# Patient Record
Sex: MALE | Race: OTHER | Employment: OTHER | URBAN - METROPOLITAN AREA
[De-identification: names, ages, dates, MRNs, and addresses within clinical notes are randomized per-mention and may not be internally consistent; named-entity substitution may affect disease eponyms.]

---

## 2017-11-01 ENCOUNTER — APPOINTMENT (OUTPATIENT)
Dept: CT IMAGING | Facility: HOSPITAL | Age: 67
DRG: 392 | End: 2017-11-01
Attending: EMERGENCY MEDICINE
Payer: MEDICARE

## 2017-11-01 ENCOUNTER — HOSPITAL ENCOUNTER (INPATIENT)
Facility: HOSPITAL | Age: 67
LOS: 2 days | Discharge: HOME | DRG: 392 | End: 2017-11-03
Attending: EMERGENCY MEDICINE | Admitting: STUDENT IN AN ORGANIZED HEALTH CARE EDUCATION/TRAINING PROGRAM
Payer: MEDICARE

## 2017-11-01 DIAGNOSIS — K21.00 GASTROESOPHAGEAL REFLUX DISEASE WITH ESOPHAGITIS: Primary | ICD-10-CM

## 2017-11-01 DIAGNOSIS — Z79.4 TYPE 2 DIABETES MELLITUS WITHOUT COMPLICATION, WITH LONG-TERM CURRENT USE OF INSULIN (HCC): ICD-10-CM

## 2017-11-01 DIAGNOSIS — E87.1 HYPONATREMIA: ICD-10-CM

## 2017-11-01 DIAGNOSIS — E87.5 HYPERKALEMIA: ICD-10-CM

## 2017-11-01 DIAGNOSIS — E11.9 TYPE 2 DIABETES MELLITUS WITHOUT COMPLICATION, WITH LONG-TERM CURRENT USE OF INSULIN (HCC): ICD-10-CM

## 2017-11-01 PROCEDURE — 99223 1ST HOSP IP/OBS HIGH 75: CPT | Performed by: STUDENT IN AN ORGANIZED HEALTH CARE EDUCATION/TRAINING PROGRAM

## 2017-11-01 PROCEDURE — 74177 CT ABD & PELVIS W/CONTRAST: CPT | Performed by: EMERGENCY MEDICINE

## 2017-11-01 RX ORDER — ONDANSETRON 2 MG/ML
4 INJECTION INTRAMUSCULAR; INTRAVENOUS EVERY 6 HOURS PRN
Status: DISCONTINUED | OUTPATIENT
Start: 2017-11-01 | End: 2017-11-03

## 2017-11-01 RX ORDER — HYDROMORPHONE HYDROCHLORIDE 1 MG/ML
0.5 INJECTION, SOLUTION INTRAMUSCULAR; INTRAVENOUS; SUBCUTANEOUS EVERY 30 MIN PRN
Status: ACTIVE | OUTPATIENT
Start: 2017-11-01 | End: 2017-11-02

## 2017-11-01 RX ORDER — ONDANSETRON 2 MG/ML
4 INJECTION INTRAMUSCULAR; INTRAVENOUS EVERY 4 HOURS PRN
Status: DISCONTINUED | OUTPATIENT
Start: 2017-11-01 | End: 2017-11-01

## 2017-11-01 RX ORDER — MORPHINE SULFATE 4 MG/ML
1 INJECTION, SOLUTION INTRAMUSCULAR; INTRAVENOUS EVERY 4 HOURS PRN
Status: DISCONTINUED | OUTPATIENT
Start: 2017-11-01 | End: 2017-11-03

## 2017-11-01 RX ORDER — DEXTROSE MONOHYDRATE 25 G/50ML
50 INJECTION, SOLUTION INTRAVENOUS
Status: DISCONTINUED | OUTPATIENT
Start: 2017-11-01 | End: 2017-11-03

## 2017-11-01 RX ORDER — SODIUM CHLORIDE 9 MG/ML
INJECTION, SOLUTION INTRAVENOUS CONTINUOUS
Status: DISCONTINUED | OUTPATIENT
Start: 2017-11-01 | End: 2017-11-02

## 2017-11-01 RX ORDER — MAGNESIUM HYDROXIDE/ALUMINUM HYDROXICE/SIMETHICONE 120; 1200; 1200 MG/30ML; MG/30ML; MG/30ML
30 SUSPENSION ORAL ONCE
Status: COMPLETED | OUTPATIENT
Start: 2017-11-01 | End: 2017-11-01

## 2017-11-01 RX ORDER — ENOXAPARIN SODIUM 100 MG/ML
40 INJECTION SUBCUTANEOUS DAILY
Status: DISCONTINUED | OUTPATIENT
Start: 2017-11-02 | End: 2017-11-03

## 2017-11-01 RX ORDER — ACETAMINOPHEN 325 MG/1
650 TABLET ORAL EVERY 6 HOURS PRN
Status: DISCONTINUED | OUTPATIENT
Start: 2017-11-01 | End: 2017-11-03

## 2017-11-01 RX ORDER — DEXTROSE MONOHYDRATE 25 G/50ML
50 INJECTION, SOLUTION INTRAVENOUS ONCE
Status: COMPLETED | OUTPATIENT
Start: 2017-11-01 | End: 2017-11-01

## 2017-11-01 RX ORDER — SODIUM CHLORIDE 9 MG/ML
INJECTION, SOLUTION INTRAVENOUS CONTINUOUS
Status: ACTIVE | OUTPATIENT
Start: 2017-11-01 | End: 2017-11-02

## 2017-11-01 RX ORDER — SODIUM CHLORIDE 9 MG/ML
INJECTION, SOLUTION INTRAVENOUS CONTINUOUS
Status: DISCONTINUED | OUTPATIENT
Start: 2017-11-01 | End: 2017-11-03

## 2017-11-01 RX ORDER — FAMOTIDINE 10 MG/ML
20 INJECTION, SOLUTION INTRAVENOUS ONCE
Status: COMPLETED | OUTPATIENT
Start: 2017-11-01 | End: 2017-11-01

## 2017-11-01 RX ORDER — MAGNESIUM HYDROXIDE/ALUMINUM HYDROXICE/SIMETHICONE 120; 1200; 1200 MG/30ML; MG/30ML; MG/30ML
30 SUSPENSION ORAL 4 TIMES DAILY PRN
Status: DISCONTINUED | OUTPATIENT
Start: 2017-11-01 | End: 2017-11-03

## 2017-11-01 RX ORDER — OMEPRAZOLE 40 MG/1
40 CAPSULE, DELAYED RELEASE ORAL DAILY
Qty: 30 CAPSULE | Refills: 0 | Status: SHIPPED | OUTPATIENT
Start: 2017-11-01 | End: 2017-11-03

## 2017-11-01 NOTE — ED INITIAL ASSESSMENT (HPI)
Generalized abd pain x4 days. C/o nausea, no vomiting. Deny diarrhea. No fever. Pt type 2 IDDM, ran out of insulin medication 3 days ago. Has not been checking his glucose.

## 2017-11-02 ENCOUNTER — SURGERY (OUTPATIENT)
Age: 67
End: 2017-11-02

## 2017-11-02 ENCOUNTER — APPOINTMENT (OUTPATIENT)
Dept: ULTRASOUND IMAGING | Facility: HOSPITAL | Age: 67
DRG: 392 | End: 2017-11-02
Attending: STUDENT IN AN ORGANIZED HEALTH CARE EDUCATION/TRAINING PROGRAM
Payer: MEDICARE

## 2017-11-02 PROCEDURE — 76700 US EXAM ABDOM COMPLETE: CPT | Performed by: STUDENT IN AN ORGANIZED HEALTH CARE EDUCATION/TRAINING PROGRAM

## 2017-11-02 PROCEDURE — 0DB68ZX EXCISION OF STOMACH, VIA NATURAL OR ARTIFICIAL OPENING ENDOSCOPIC, DIAGNOSTIC: ICD-10-PCS | Performed by: INTERNAL MEDICINE

## 2017-11-02 PROCEDURE — 99232 SBSQ HOSP IP/OBS MODERATE 35: CPT | Performed by: INTERNAL MEDICINE

## 2017-11-02 RX ORDER — MIDAZOLAM HYDROCHLORIDE 1 MG/ML
INJECTION INTRAMUSCULAR; INTRAVENOUS
Status: DISCONTINUED | OUTPATIENT
Start: 2017-11-02 | End: 2017-11-02 | Stop reason: HOSPADM

## 2017-11-02 RX ORDER — PANTOPRAZOLE SODIUM 40 MG/1
40 TABLET, DELAYED RELEASE ORAL
Status: DISCONTINUED | OUTPATIENT
Start: 2017-11-02 | End: 2017-11-03

## 2017-11-02 NOTE — PROGRESS NOTES
NURSING ADMISSION NOTE      Patient admitted via Cart  Oriented to room 521  Safety precautions initiated. Bed in low position. Call light in reach. Pt arrived to [facility] from emergency department.  Pt A/O x 3, primarily Wolof speaking, pt/family ref

## 2017-11-02 NOTE — PROGRESS NOTES
[facility]  Progress Note     Mat Jason Patient Status:  Inpatient    1950 MRN [id number]   [facility] 5NW-A Attending Alina Childers MD   [facility] Day # 1 PCP None Pcp     Chief Complaint: abd pain    S: Patient doing well, no n Pen  1-5 Units Subcutaneous TID CC and HS   • pantoprazole (PROTONIX) IV push  40 mg Intravenous Daily       ASSESSMENT / PLAN:     1. Nausea, GI pain suspect due to esophageal ulcer  1. Pt stopped PPI 6 mo ago  2.  CT A/P reviewed- hepatic steatosis noted

## 2017-11-02 NOTE — OPERATIVE REPORT
[facility]  Esophagogastroduodenoscopy Report    Elsa Jacobo Patient Status:  Inpatient    1950 MRN [id number]   [facility] ENDOSCOPY Attending Ro Hess MD      DATE OF OPERATION: 2017     PREOPERATIVE DIAGNOSIS:

## 2017-11-02 NOTE — CM/SW NOTE
Mino Espitia 11/02/17 1500   CM/SW Screening   Referral [id number] staff; Chart review;Nursing rounds   Patient's Mental Status Alert;Oriented   Patient lives with Spouse   Patient Status Prior to Admission   Independent with ADLs

## 2017-11-02 NOTE — H&P
[facility]  History and Physical     Nancy Half Patient Status:  Emergency    1950 MRN [id number]   Location [facility] Attending Isabel Hair MD   Hosp Day # 0 PCP None Pcp     Chief Complaint: Abdominal obed Onset   • Diabetes Sister        Allergies: No Known Allergies    Medications:    No current facility-administered medications on file prior to encounter.    Current Outpatient Prescriptions on File Prior to Encounter:  Omeprazole 40 MG Oral Capsule Delayed CL  94*   CO2  22.0   ALKPHO  67   AST  23   ALT  33   BILT  0.8   TP  6.7       Estimated Creatinine Clearance: 52.2 mL/min (based on SCr of 1.21 mg/dL). No results for input(s): PTP, INR in the last 72 hours.     No results for input(s): TROP, CK in

## 2017-11-02 NOTE — PLAN OF CARE
Diabetes/Glucose Control    • Glucose maintained within prescribed range Progressing        GASTROINTESTINAL - ADULT    • Minimal or absence of nausea and vomiting Progressing    • Maintains adequate nutritional intake (undernourished) Progressing        P

## 2017-11-02 NOTE — CONSULTS
[facility] Patient Status:  Inpatient   Date of Birth 6/9/1950 MRN [id number]   [facility] 5NW-A Attending Jeaneth Roberts MD   [facility] Day # 1 PCP None Pcp     Date of Consultation:  11/2/2017 MG Oral Tab, Sig TAKE 1 TABLET(1000 MG) BY MOUTH TWICE DAILY WITH MEALS, Start Date 16, End Date , Taking?  Yes, Authorizing Provider Yen Min MD    Medication Lisinopril-Hydrochlorothiazide 20-12.5 MG Oral Tab, Sig Take 1 tablet by mouth once skye positives and negatives noted in the the HPI.       Physical Exam:    Vital Signs:    11/02/17  0807   BP: 133/71   Pulse: 90   Resp: 18   Temp: 98.8 °F (37.1 °C)     General: Alert, oriented and in no acute distress  HEENT: normocephalic; non-icteric; oral enlargement or focal lesion. PANCREAS:  No areli-pancreatic inflammatory stranding  ADRENALS:  Normal.  No mass or enlargement. KIDNEYS:  Kidneys are symmetrical in size without evidence of hydronephrosis. Bilateral renal cysts.  Hypodense lesion within

## 2017-11-02 NOTE — ED PROVIDER NOTES
Patient Seen in: [facility] Emergency Department    History   Patient presents with:  Abdomen/Flank Pain (GI/)  Nausea/Vomiting/Diarrhea (gastrointestinal)    Stated Complaint: abd pain, nausea    HPI    61-year-old male presents with abdominal pa Smokeless tobacco: Never Used                      Alcohol use: Yes              Comment: SOCIAL      Review of Systems    Positive for stated complaint: abd pain, nausea  Other systems are as noted in HPI.   Constitu were created for panel order CBC WITH DIFFERENTIAL WITH PLATELET.   Procedure                               Abnormality         Status                     ---------                               -----------         ------                     CBC W/ DIFFERSABRINA kidney measuring 1.9 x 2.1 cm contains tiny peripheral calcification and is slightly increased in size from 1/25/16. This is hyperdense on noncontrast imaging and may represent a complex cyst but is technically indeterminate.  BOWEL/MESENTERY:  Bowel is nor with patient and family who agree with admission. [facility]  Await bed assignment.     Disposition and Plan     Clinical Impression:  Gastroesophageal reflux disease with esophagitis  (primary encounter diagnosis)  Hyperk

## 2017-11-03 VITALS
WEIGHT: 210.31 LBS | OXYGEN SATURATION: 95 % | RESPIRATION RATE: 18 BRPM | DIASTOLIC BLOOD PRESSURE: 77 MMHG | TEMPERATURE: 98 F | HEIGHT: 65 IN | SYSTOLIC BLOOD PRESSURE: 145 MMHG | BODY MASS INDEX: 35.04 KG/M2 | HEART RATE: 71 BPM

## 2017-11-03 PROCEDURE — 99239 HOSP IP/OBS DSCHRG MGMT >30: CPT | Performed by: INTERNAL MEDICINE

## 2017-11-03 RX ORDER — BLOOD SUGAR DIAGNOSTIC
STRIP MISCELLANEOUS
Qty: 50 STRIP | Refills: 1 | Status: SHIPPED | OUTPATIENT
Start: 2017-11-03 | End: 2018-01-16

## 2017-11-03 RX ORDER — LANCETS 28 GAUGE
EACH MISCELLANEOUS
Qty: 50 EACH | Refills: 1 | Status: SHIPPED | OUTPATIENT
Start: 2017-11-03 | End: 2018-01-16

## 2017-11-03 RX ORDER — OMEPRAZOLE 40 MG/1
40 CAPSULE, DELAYED RELEASE ORAL 2 TIMES DAILY
Qty: 60 CAPSULE | Refills: 0 | Status: SHIPPED | OUTPATIENT
Start: 2017-11-03 | End: 2017-11-30

## 2017-11-03 NOTE — PROGRESS NOTES
[facility]  GI Progress Note      Shante Nettles Patient Status:  Inpatient    1950 MRN [id number]   [facility] 5NW-A Attending Bartolo Bautista MD   [facility] Day # 2 PCP None Pcp          SUBJECTIVE:     He feels better. No pain.     [address]

## 2017-11-03 NOTE — PROGRESS NOTES
Pt being discharged, meds and gluc meter discussed with pt and daughter, pt watched videos about dm education, all f/u appts disccused.  IV dcd

## 2017-11-03 NOTE — DISCHARGE SUMMARY
[facility] [place] [facility]  DISCHARGE SUMMARY     Eryn Ca Patient Status:  Inpatient    1950 MRN [id number]   [facility] 5NW-A Attending Campbell Lee MD   [facility] Day [facility] PCP None Pcp     Date of Admission: 2017  Date of Discharge:  11 cyst better visualized on recent contrast enhanced CT. A dedicated CT of the kidneys can be performed for further evaluation as clinically indicated.     Dictated by: Yolanda Ivey MD on 11/02/2017 at 11:35     Approved by: Yolanda Ivey MD            Ct Abdomen+ Instructions Prescription details   Omeprazole 40 MG Cpdr  What changed:  · how much to take  · how to take this  · when to take this  · additional instructions      Take 1 capsule (40 mg total) by mouth 2 (two) times daily.    Stop taking on:  12/3/2017  Q edema.  -----------------------------------------------------------------------------------------------  PATIENT DISCHARGE INSTRUCTIONS: See electronic chart    Ruth Fagan MD 11/3/2017    Time spent:  > 30 minutes

## 2017-11-03 NOTE — CONSULTS
[facility]  Diabetes Consult Note    Jone Cervantes Patient Status:  Inpatient    1950 MRN [id number]   [facility] 5NW-A Attending Wood Decker MD   [facility] Day # 2 PCP None Pcp     Reason for Consult:     Uncontrolled type 2 diabete 11/02/17   1146  11/02/17   2100  11/03/17   0734   PGLU  253*  161*  144*  196*  189*       Recent Labs   11/01/17  1834  11/02/17  0029 11/02/17  0704  11/03/17  0734   *  126*  --  130* 133*  --   --    CL 95*  94*  --  96* 100*  --   --    CO2 22 symptoms and treatment for hypo/hyperglycemia, sick day management. Discuss complication risks as a consequence of high blood glucose and the need for insulin. He became tearful, but stated he understood.   At this time he only has Medicare Part A and B -

## 2017-11-14 ENCOUNTER — OFFICE VISIT (OUTPATIENT)
Dept: ENDOCRINOLOGY CLINIC | Facility: CLINIC | Age: 67
End: 2017-11-14

## 2017-11-14 VITALS
TEMPERATURE: 98 F | WEIGHT: 216 LBS | DIASTOLIC BLOOD PRESSURE: 60 MMHG | HEIGHT: 66 IN | SYSTOLIC BLOOD PRESSURE: 104 MMHG | HEART RATE: 68 BPM | RESPIRATION RATE: 18 BRPM | BODY MASS INDEX: 34.72 KG/M2

## 2017-11-14 DIAGNOSIS — E11.9 TYPE 2 DIABETES MELLITUS WITHOUT COMPLICATION, WITH LONG-TERM CURRENT USE OF INSULIN (HCC): Primary | ICD-10-CM

## 2017-11-14 DIAGNOSIS — Z79.4 TYPE 2 DIABETES MELLITUS WITHOUT COMPLICATION, WITH LONG-TERM CURRENT USE OF INSULIN (HCC): Primary | ICD-10-CM

## 2017-11-14 RX ORDER — CALCIUM CITRATE/VITAMIN D3 200MG-6.25
TABLET ORAL
Qty: 100 STRIP | Refills: 1 | Status: SHIPPED | OUTPATIENT
Start: 2017-11-14 | End: 2017-12-19

## 2017-11-14 RX ORDER — GLUCOSAM/CHON-MSM1/C/MANG/BOSW 500-416.6
1 TABLET ORAL DAILY
Qty: 1 BOX | Refills: 0 | Status: SHIPPED | OUTPATIENT
Start: 2017-11-14 | End: 2017-12-19

## 2017-11-14 NOTE — PROGRESS NOTES
Diabetes Education:    Taught patient how to use True Metrix glucose meter. He was able to test his glucose today while in the office. Glucose approximately 3 hours after eating was 289 mg/dl. Meter and strips provided.     Ana Salazar RD, LDN, CDE  Ed

## 2017-11-14 NOTE — PROGRESS NOTES
CC: Patient presents with:  Diabetes: new pt.  referred by hospital.      HISTORY:  Past Medical History:   Diagnosis Date   • Arthritis    • BPH (benign prostatic hypertrophy)    • Esophageal reflux    • Obesity    • STROKE     8 or 9 yrs ago per family 11/02/2017   A1C 11.9 (H) 11/02/2017       Diabetes Type: 2  Duration:> 15 years   Current Meds: metformin 1000 mg bid    SE: denies   Failed Meds: stated had been on insulin in past     Overall glucose control:poor uncontrolled unstable A1C at 11.9%   Colt Palacios Only:  If on oral medications, test blood glucose once a day in the morning before breakfast and for signs, symptoms of hypoglycemia or as ordered by physician, Disp: 50 strip, Rfl: 1  •  FREESTYLE LANCETS Does not apply Misc, Medicare Only:  If on oral me period of time safely, stressed do not advise he leave to go out of town. If he insists, to take metformin as prescribed, drink lots of water, reduce carb intake, try to exercise, and f/u asap.  Needs labs and most likely insulin, needs to have coverage on

## 2017-11-15 ENCOUNTER — OFFICE VISIT (OUTPATIENT)
Dept: ENDOCRINOLOGY CLINIC | Facility: CLINIC | Age: 67
End: 2017-11-15

## 2017-11-15 VITALS
HEIGHT: 66 IN | HEART RATE: 72 BPM | SYSTOLIC BLOOD PRESSURE: 108 MMHG | WEIGHT: 216 LBS | DIASTOLIC BLOOD PRESSURE: 60 MMHG | TEMPERATURE: 98 F | BODY MASS INDEX: 34.72 KG/M2

## 2017-11-15 DIAGNOSIS — E11.69 DIABETES MELLITUS TYPE 2 IN OBESE (HCC): Primary | ICD-10-CM

## 2017-11-15 DIAGNOSIS — Z13.220 SCREENING FOR LIPID DISORDERS: ICD-10-CM

## 2017-11-15 DIAGNOSIS — I10 ESSENTIAL HYPERTENSION: ICD-10-CM

## 2017-11-15 DIAGNOSIS — E66.9 DIABETES MELLITUS TYPE 2 IN OBESE (HCC): Primary | ICD-10-CM

## 2017-11-15 PROCEDURE — 99214 OFFICE O/P EST MOD 30 MIN: CPT | Performed by: NURSE PRACTITIONER

## 2017-11-15 NOTE — PROGRESS NOTES
CC: Patient presents with:  Diabetes: follow up. pt did not leave to [facility] and wanted to get treated for his diabetes.       HISTORY:  Past Medical History:   Diagnosis Date   • Arthritis    • BPH (benign prostatic hypertrophy)    • Esophageal reflux    • Meds: stated had been on insulin in past     Overall glucose control:poor uncontrolled unstable A1C at 11.9%  Using True Metrix meter   Average Fasting glucose hasn't started checking   Average post meal glucose not checking    Hypoglycemia frequency kole morning before breakfast and for signs, symptoms of hypoglycemia or as ordered by physician, Disp: 50 strip, Rfl: 1  •  FREESTYLE LANCETS Does not apply Misc, Medicare Only:  If on oral medications, test blood glucose once a day in the morning before break Flu vaccine: needs   Pneumonia vaccine: needs   Depression screen: up to date     Check glucoses 1-2 times daily - fasting, premeals and/or bedtime. Call/fax/email glucose logs in asap     No Follow-up on file.     Pt verbalized understanding and has no

## 2017-11-15 NOTE — PROGRESS NOTES
Please send to the patient:    Dear Milad Kingsley,    I reviewed the results from your recent gastroscopy while you were in the hospital. I tried to call you but was not successful.  The biopsies from the stomach are normal.     I would like you to continue taking

## 2017-11-17 ENCOUNTER — LAB ENCOUNTER (OUTPATIENT)
Dept: LAB | Age: 67
End: 2017-11-17
Attending: INTERNAL MEDICINE
Payer: MEDICARE

## 2017-11-17 ENCOUNTER — OFFICE VISIT (OUTPATIENT)
Dept: INTERNAL MEDICINE CLINIC | Facility: CLINIC | Age: 67
End: 2017-11-17

## 2017-11-17 VITALS
SYSTOLIC BLOOD PRESSURE: 99 MMHG | HEART RATE: 78 BPM | RESPIRATION RATE: 18 BRPM | HEIGHT: 65.75 IN | TEMPERATURE: 99 F | BODY MASS INDEX: 34.32 KG/M2 | WEIGHT: 211 LBS | DIASTOLIC BLOOD PRESSURE: 70 MMHG

## 2017-11-17 DIAGNOSIS — Z79.4 TYPE 2 DIABETES MELLITUS WITHOUT COMPLICATION, WITH LONG-TERM CURRENT USE OF INSULIN (HCC): ICD-10-CM

## 2017-11-17 DIAGNOSIS — M79.10 GENERALIZED MUSCLE ACHE: ICD-10-CM

## 2017-11-17 DIAGNOSIS — M25.511 ACUTE PAIN OF BOTH SHOULDERS: ICD-10-CM

## 2017-11-17 DIAGNOSIS — I10 ESSENTIAL HYPERTENSION: ICD-10-CM

## 2017-11-17 DIAGNOSIS — R10.84 GENERALIZED ABDOMINAL PAIN: Primary | ICD-10-CM

## 2017-11-17 DIAGNOSIS — E11.9 TYPE 2 DIABETES MELLITUS WITHOUT COMPLICATION, WITH LONG-TERM CURRENT USE OF INSULIN (HCC): ICD-10-CM

## 2017-11-17 DIAGNOSIS — E66.9 DIABETES MELLITUS TYPE 2 IN OBESE (HCC): ICD-10-CM

## 2017-11-17 DIAGNOSIS — M25.512 ACUTE PAIN OF BOTH SHOULDERS: ICD-10-CM

## 2017-11-17 DIAGNOSIS — Z13.220 SCREENING FOR LIPID DISORDERS: ICD-10-CM

## 2017-11-17 DIAGNOSIS — R10.84 GENERALIZED ABDOMINAL PAIN: ICD-10-CM

## 2017-11-17 DIAGNOSIS — E11.69 DIABETES MELLITUS TYPE 2 IN OBESE (HCC): ICD-10-CM

## 2017-11-17 PROCEDURE — 82570 ASSAY OF URINE CREATININE: CPT

## 2017-11-17 PROCEDURE — 82550 ASSAY OF CK (CPK): CPT

## 2017-11-17 PROCEDURE — 80061 LIPID PANEL: CPT

## 2017-11-17 PROCEDURE — 36415 COLL VENOUS BLD VENIPUNCTURE: CPT

## 2017-11-17 PROCEDURE — 86235 NUCLEAR ANTIGEN ANTIBODY: CPT

## 2017-11-17 PROCEDURE — 86038 ANTINUCLEAR ANTIBODIES: CPT

## 2017-11-17 PROCEDURE — 80053 COMPREHEN METABOLIC PANEL: CPT

## 2017-11-17 PROCEDURE — 86225 DNA ANTIBODY NATIVE: CPT

## 2017-11-17 PROCEDURE — 85652 RBC SED RATE AUTOMATED: CPT

## 2017-11-17 PROCEDURE — 82043 UR ALBUMIN QUANTITATIVE: CPT

## 2017-11-17 PROCEDURE — 86200 CCP ANTIBODY: CPT

## 2017-11-17 PROCEDURE — 86431 RHEUMATOID FACTOR QUANT: CPT

## 2017-11-17 PROCEDURE — 99214 OFFICE O/P EST MOD 30 MIN: CPT | Performed by: INTERNAL MEDICINE

## 2017-11-17 RX ORDER — RANITIDINE 150 MG/1
150 TABLET ORAL 2 TIMES DAILY
Qty: 60 TABLET | Refills: 1 | Status: SHIPPED | OUTPATIENT
Start: 2017-11-17 | End: 2017-12-13

## 2017-11-17 RX ORDER — DICYCLOMINE HYDROCHLORIDE 10 MG/1
10 CAPSULE ORAL
Qty: 40 CAPSULE | Refills: 1 | Status: SHIPPED | OUTPATIENT
Start: 2017-11-17 | End: 2017-11-27

## 2017-11-17 NOTE — PROGRESS NOTES
Lili Cook is a 78year old male. HPI:   Patient presents with:  Abdominal Pain: x 1 month not eating   Arm Pain: shoulder pain radiaiting down to his elbow   Patient is a new patient, here to establish care.    Family members in the room with patien RaNITidine HCl 150 MG Oral Tab, Take 1 tablet (150 mg total) by mouth 2 (two) times daily. , Disp: 60 tablet, Rfl: 1  •  Dicyclomine HCl 10 MG Oral Cap, Take 1 capsule (10 mg total) by mouth 4 (four) times daily before meals and nightly., Disp: 40 capsule, 4/25/2016). Family: family history includes Diabetes in his sister; Other in his son and son. Social:  reports that he has never smoked. He has never used smokeless tobacco. He reports that he does not drink alcohol or use drugs.   Wt Readings from Last 6 eating(though he has been eating less and less). Will defer to G. I. Advised patient to call or go to emergency department if symptoms worsen. 2/3.  Acute pain of shoulders, generalized muscle aches  patient has been expressing the symptoms since he got h

## 2017-11-17 NOTE — PATIENT INSTRUCTIONS
- Continue omeprazole twice daily  - Start ranitidine (150 mg) daily  - If the ranitidine does not help, you can try dicyclomine (with meals and at night)  - Follow up with Dr. Jamil Kang  - For your muscle pain, we will start with checking some blood tests.

## 2017-11-18 ENCOUNTER — HOSPITAL ENCOUNTER (EMERGENCY)
Facility: HOSPITAL | Age: 67
Discharge: HOME | End: 2017-11-18
Attending: EMERGENCY MEDICINE
Payer: MEDICARE

## 2017-11-18 ENCOUNTER — APPOINTMENT (OUTPATIENT)
Dept: CT IMAGING | Facility: HOSPITAL | Age: 67
End: 2017-11-18
Attending: EMERGENCY MEDICINE
Payer: MEDICARE

## 2017-11-18 VITALS
OXYGEN SATURATION: 100 % | WEIGHT: 209.44 LBS | RESPIRATION RATE: 18 BRPM | SYSTOLIC BLOOD PRESSURE: 128 MMHG | DIASTOLIC BLOOD PRESSURE: 67 MMHG | BODY MASS INDEX: 33.66 KG/M2 | TEMPERATURE: 98 F | HEIGHT: 66 IN | HEART RATE: 71 BPM

## 2017-11-18 DIAGNOSIS — R10.33 ABDOMINAL PAIN, PERIUMBILICAL: Primary | ICD-10-CM

## 2017-11-18 PROCEDURE — 74176 CT ABD & PELVIS W/O CONTRAST: CPT | Performed by: EMERGENCY MEDICINE

## 2017-11-18 PROCEDURE — 85025 COMPLETE CBC W/AUTO DIFF WBC: CPT | Performed by: EMERGENCY MEDICINE

## 2017-11-18 PROCEDURE — 83690 ASSAY OF LIPASE: CPT | Performed by: EMERGENCY MEDICINE

## 2017-11-18 PROCEDURE — 80053 COMPREHEN METABOLIC PANEL: CPT | Performed by: EMERGENCY MEDICINE

## 2017-11-18 PROCEDURE — 81003 URINALYSIS AUTO W/O SCOPE: CPT | Performed by: EMERGENCY MEDICINE

## 2017-11-18 PROCEDURE — 96372 THER/PROPH/DIAG INJ SC/IM: CPT

## 2017-11-18 PROCEDURE — 96375 TX/PRO/DX INJ NEW DRUG ADDON: CPT

## 2017-11-18 PROCEDURE — 99284 EMERGENCY DEPT VISIT MOD MDM: CPT

## 2017-11-18 PROCEDURE — 96361 HYDRATE IV INFUSION ADD-ON: CPT

## 2017-11-18 PROCEDURE — 96374 THER/PROPH/DIAG INJ IV PUSH: CPT

## 2017-11-18 PROCEDURE — 99285 EMERGENCY DEPT VISIT HI MDM: CPT

## 2017-11-18 RX ORDER — SODIUM CHLORIDE 9 MG/ML
125 INJECTION, SOLUTION INTRAVENOUS CONTINUOUS
Status: DISCONTINUED | OUTPATIENT
Start: 2017-11-18 | End: 2017-11-18

## 2017-11-18 RX ORDER — ONDANSETRON 4 MG/1
4 TABLET, ORALLY DISINTEGRATING ORAL EVERY 4 HOURS PRN
Qty: 10 TABLET | Refills: 0 | Status: SHIPPED | OUTPATIENT
Start: 2017-11-18 | End: 2017-11-25

## 2017-11-18 RX ORDER — DIPHENHYDRAMINE HYDROCHLORIDE 50 MG/ML
25 INJECTION INTRAMUSCULAR; INTRAVENOUS ONCE
Status: COMPLETED | OUTPATIENT
Start: 2017-11-18 | End: 2017-11-18

## 2017-11-18 RX ORDER — MORPHINE SULFATE 4 MG/ML
4 INJECTION, SOLUTION INTRAMUSCULAR; INTRAVENOUS ONCE
Status: COMPLETED | OUTPATIENT
Start: 2017-11-18 | End: 2017-11-18

## 2017-11-18 RX ORDER — METOCLOPRAMIDE HYDROCHLORIDE 5 MG/ML
10 INJECTION INTRAMUSCULAR; INTRAVENOUS ONCE
Status: COMPLETED | OUTPATIENT
Start: 2017-11-18 | End: 2017-11-18

## 2017-11-18 RX ORDER — DICYCLOMINE HYDROCHLORIDE 10 MG/ML
20 INJECTION INTRAMUSCULAR ONCE
Status: COMPLETED | OUTPATIENT
Start: 2017-11-18 | End: 2017-11-18

## 2017-11-18 RX ORDER — ONDANSETRON 2 MG/ML
4 INJECTION INTRAMUSCULAR; INTRAVENOUS ONCE
Status: COMPLETED | OUTPATIENT
Start: 2017-11-18 | End: 2017-11-18

## 2017-11-18 NOTE — ED INITIAL ASSESSMENT (HPI)
Pt here for periumbilical abdominal pain that has persisted for three days. Pt has been here and to urgent care and therapy is not working. Pt denies vomiting or nausea.

## 2017-11-18 NOTE — ED PROVIDER NOTES
Patient Seen in: [facility] Emergency Department    History   Patient presents with:  Nausea/Vomiting/Diarrhea (gastrointestinal)    Stated Complaint: generalized pain    HPI    72-year-old male returns for evaluation of pain.   He was seen earlier t complaint: generalized pain  Other systems are as noted in HPI. Constitutional and vital signs reviewed. All other systems reviewed and negative except as noted above.     Physical Exam   ED Triage Vitals [11/18/17 0907]  BP: 140/72  Pulse: 62  Resp: PLATELET    Narrative: The following orders were created for panel order CBC WITH DIFFERENTIAL WITH PLATELET.   Procedure                               Abnormality         Status                     ---------                               ----------- total) by mouth every 4 (four) hours as needed for Nausea., Script printed, Disp-10 tablet, R-0

## 2017-11-22 ENCOUNTER — NURSE ONLY (OUTPATIENT)
Dept: ENDOCRINOLOGY CLINIC | Facility: CLINIC | Age: 67
End: 2017-11-22

## 2017-11-22 VITALS
HEART RATE: 88 BPM | SYSTOLIC BLOOD PRESSURE: 128 MMHG | DIASTOLIC BLOOD PRESSURE: 66 MMHG | BODY MASS INDEX: 34 KG/M2 | WEIGHT: 209 LBS

## 2017-11-22 DIAGNOSIS — Z79.4 TYPE 2 DIABETES MELLITUS WITHOUT COMPLICATION, WITH LONG-TERM CURRENT USE OF INSULIN (HCC): Primary | ICD-10-CM

## 2017-11-22 DIAGNOSIS — E11.9 TYPE 2 DIABETES MELLITUS WITHOUT COMPLICATION, WITH LONG-TERM CURRENT USE OF INSULIN (HCC): Primary | ICD-10-CM

## 2017-11-22 PROCEDURE — G0108 DIAB MANAGE TRN  PER INDIV: HCPCS

## 2017-11-22 NOTE — PROGRESS NOTES
Errol Salvador  : 1950 attendedep 1 Diabetic Education In [place]:    Date: 2017       /66   Pulse 88   Wt 209 lb   BMI 33.73 kg/m²       HEMOGLOBIN A1C (%)   Date Value   01/15/2016 8.6 (H)   ----------  Hemoglobin A1c (%)   Date Value

## 2017-11-27 ENCOUNTER — HOSPITAL ENCOUNTER (OUTPATIENT)
Dept: GENERAL RADIOLOGY | Age: 67
Discharge: HOME | End: 2017-11-27
Attending: INTERNAL MEDICINE
Payer: MEDICARE

## 2017-11-27 ENCOUNTER — OFFICE VISIT (OUTPATIENT)
Dept: INTERNAL MEDICINE CLINIC | Facility: CLINIC | Age: 67
End: 2017-11-27

## 2017-11-27 VITALS
WEIGHT: 208.25 LBS | HEIGHT: 65.75 IN | DIASTOLIC BLOOD PRESSURE: 80 MMHG | SYSTOLIC BLOOD PRESSURE: 112 MMHG | TEMPERATURE: 97 F | RESPIRATION RATE: 17 BRPM | HEART RATE: 72 BPM | BODY MASS INDEX: 33.87 KG/M2

## 2017-11-27 DIAGNOSIS — M17.11 ARTHRITIS OF RIGHT KNEE: Primary | ICD-10-CM

## 2017-11-27 DIAGNOSIS — M17.11 ARTHRITIS OF RIGHT KNEE: ICD-10-CM

## 2017-11-27 PROCEDURE — 99214 OFFICE O/P EST MOD 30 MIN: CPT | Performed by: INTERNAL MEDICINE

## 2017-11-27 PROCEDURE — 73560 X-RAY EXAM OF KNEE 1 OR 2: CPT | Performed by: INTERNAL MEDICINE

## 2017-11-27 RX ORDER — DICLOFENAC SODIUM 75 MG/1
75 TABLET, DELAYED RELEASE ORAL 2 TIMES DAILY
Qty: 60 TABLET | Refills: 0 | Status: SHIPPED | OUTPATIENT
Start: 2017-11-27 | End: 2017-11-27

## 2017-11-27 RX ORDER — DICLOFENAC SODIUM 75 MG/1
75 TABLET, DELAYED RELEASE ORAL 2 TIMES DAILY
Qty: 60 TABLET | Refills: 0 | Status: SHIPPED | OUTPATIENT
Start: 2017-11-27 | End: 2017-12-20

## 2017-11-27 NOTE — PROGRESS NOTES
Patient presents with:  Knee Pain: R knee pain       HPI: The patient presents for R knee pain evaluation:  Onset/Duration = Many years and worsening now  Location = R knee global aspect  Radiation = none  Mechanism of injury = none  Quality of pain = sore f/u.  Patient verbally agrees to and understands the plan as outlined above. Patient was also afforded the time and opportunity to ask questions, which were then answered to the best of my ability. Deb Vogt.  Brigid Mora MD  Diplomate, [address] [place] [address] Board  Inter

## 2017-11-28 ENCOUNTER — NURSE ONLY (OUTPATIENT)
Dept: ENDOCRINOLOGY CLINIC | Facility: CLINIC | Age: 67
End: 2017-11-28

## 2017-11-28 VITALS
DIASTOLIC BLOOD PRESSURE: 68 MMHG | BODY MASS INDEX: 34 KG/M2 | SYSTOLIC BLOOD PRESSURE: 120 MMHG | HEART RATE: 88 BPM | WEIGHT: 208 LBS

## 2017-11-28 DIAGNOSIS — E11.69 DIABETES MELLITUS TYPE 2 IN OBESE (HCC): Primary | ICD-10-CM

## 2017-11-28 DIAGNOSIS — E66.9 DIABETES MELLITUS TYPE 2 IN OBESE (HCC): Primary | ICD-10-CM

## 2017-11-28 PROCEDURE — G0108 DIAB MANAGE TRN  PER INDIV: HCPCS

## 2017-11-28 RX ORDER — INSULIN ASPART 100 [IU]/ML
6 INJECTION, SOLUTION INTRAVENOUS; SUBCUTANEOUS
Qty: 3 VIAL | Refills: 3 | COMMUNITY
Start: 2017-11-28 | End: 2017-12-19

## 2017-11-28 NOTE — PROGRESS NOTES
Mohan Rosibel  : 1950 was seen for Diabetic Education Follow up:    Date: 2017       Assessment:     Assessment: /68   Pulse 88   Wt 208 lb   BMI 33.83 kg/m²        HEMOGLOBIN A1C (%)   Date Value   01/15/2016 8.6 (H)   ----------  Hemog mg/dL   GFR 76 >=60   Calcium, Total 8.9 8.3 - 10.3 mg/dL   Alkaline Phosphatase 66 45 - 117 U/L   AST 27 15 - 41 U/L   Alt 30 17 - 63 U/L   Bilirubin, Total 1.0 0.1 - 2.0 mg/dL   Total Protein 6.9 6.1 - 8.3 g/dL   Albumin 3.5 3.5 - 4.8 g/dL   Sodium 127 ( 0.00 - 1.00 x10(3) uL   Neutrophil % 57.7 %   Lymphocyte % 27.1 %   Monocyte % 9.4 %   Eosinophil % 4.6 %   Basophil % 0.8 %   Immature Granulocyte % 0.4 %         Healthy Eating  Reviewed basic nutrition concepts for diabetes management.   Reviewed (choice

## 2017-11-30 PROBLEM — K57.30 DIVERTICULOSIS OF LARGE INTESTINE WITHOUT HEMORRHAGE: Status: ACTIVE | Noted: 2017-11-30

## 2017-11-30 PROBLEM — K21.9 GASTROESOPHAGEAL REFLUX DISEASE WITHOUT ESOPHAGITIS: Status: ACTIVE | Noted: 2017-11-30

## 2017-12-04 ENCOUNTER — NURSE ONLY (OUTPATIENT)
Dept: ENDOCRINOLOGY CLINIC | Facility: CLINIC | Age: 67
End: 2017-12-04

## 2017-12-04 VITALS
BODY MASS INDEX: 32 KG/M2 | DIASTOLIC BLOOD PRESSURE: 68 MMHG | WEIGHT: 207 LBS | SYSTOLIC BLOOD PRESSURE: 120 MMHG | HEART RATE: 78 BPM

## 2017-12-04 DIAGNOSIS — E66.9 DIABETES MELLITUS TYPE 2 IN OBESE (HCC): Primary | ICD-10-CM

## 2017-12-04 DIAGNOSIS — E11.69 DIABETES MELLITUS TYPE 2 IN OBESE (HCC): Primary | ICD-10-CM

## 2017-12-04 PROCEDURE — G0108 DIAB MANAGE TRN  PER INDIV: HCPCS

## 2017-12-04 NOTE — PROGRESS NOTES
Deepthi Garza  : 1950 was seen for Diabetic Education Follow up:    Date: 2017       Assessment:     Assessment: /68   Pulse 78   Wt 207 lb   BMI 32.42 kg/m²        HEMOGLOBIN A1C (%)   Date Value   01/15/2016 8.6 (H)   ----------  Hemogl 6.9 6.1 - 8.3 g/dL   Albumin 3.5 3.5 - 4.8 g/dL   Sodium 127 (L) 136 - 144 mmol/L   Potassium 4.4 3.6 - 5.1 mmol/L   Chloride 94 (L) 101 - 111 mmol/L   CO2 25.0 22.0 - 32.0 mmol/L   -URINALYSIS WITH CULTURE REFLEX   Result Value Ref Range   Urine Color Yel nutrition concepts for diabetes management. Reviewed (choice, carbohydrate control using the healthy plate method and food models.   Reviewed principles for heart healthy diet (low fate, low saturated fat, high fiber, reduced sodium)    Being Active  Benef

## 2017-12-12 ENCOUNTER — LAB ENCOUNTER (OUTPATIENT)
Dept: LAB | Age: 67
End: 2017-12-12
Attending: INTERNAL MEDICINE
Payer: MEDICARE

## 2017-12-12 ENCOUNTER — OFFICE VISIT (OUTPATIENT)
Dept: INTERNAL MEDICINE CLINIC | Facility: CLINIC | Age: 67
End: 2017-12-12

## 2017-12-12 VITALS
BODY MASS INDEX: 32 KG/M2 | TEMPERATURE: 98 F | DIASTOLIC BLOOD PRESSURE: 58 MMHG | RESPIRATION RATE: 16 BRPM | SYSTOLIC BLOOD PRESSURE: 96 MMHG | HEART RATE: 70 BPM | WEIGHT: 205 LBS | OXYGEN SATURATION: 98 %

## 2017-12-12 DIAGNOSIS — R19.7 DIARRHEA OF PRESUMED INFECTIOUS ORIGIN: ICD-10-CM

## 2017-12-12 DIAGNOSIS — R19.7 DIARRHEA OF PRESUMED INFECTIOUS ORIGIN: Primary | ICD-10-CM

## 2017-12-12 PROCEDURE — 99213 OFFICE O/P EST LOW 20 MIN: CPT | Performed by: NURSE PRACTITIONER

## 2017-12-12 NOTE — PATIENT INSTRUCTIONS
Diabetes: Sick-Day Plan  Infections, the flu, and even a cold, can cause your blood sugar to rise. And, eating less, nausea, and vomiting may cause your blood glucose to fall (hypoglycemia).  Ask your healthcare provider to help you develop a sick-day bi Call your healthcare provider if you have any of the following:  · You vomit or have diarrhea for more than 6 hours.   · Your blood glucose level is higher than usual or more than 250 mg/dL after you have taken extra insulin (if recommended in your sick-day

## 2017-12-12 NOTE — PLAN OF CARE
RN called office and verified phone number. The office only has the pt's cell number which is invalid. RN told office we will keep trying the pt family member that does have a valid number.

## 2017-12-12 NOTE — PROGRESS NOTES
CHIEF COMPLAINT:   Patient presents with:  Pre-Op Exam: right knee replacement  Nausea: no vomitting-  diarhea 1x week  no appetite .  drinking fluids      HPI:   Harjit Restrepo is a 78year old male who presents for complaints of nausea, diarrhea, vomitti daily. Disp: 60 tablet Rfl: 1   MetFORMIN HCl 1000 MG Oral Tab Take 1 tablet (1,000 mg total) by mouth 2 (two) times daily with meals.  Disp: 180 tablet Rfl: 0   Glucose Blood (TRUE METRIX BLOOD GLUCOSE TEST) In Vitro Strip Test daily Disp: 100 strip Rfl: 1 hernia.   : urinating frequency normal per patient, denies dark colored urine  NEURO: denies headaches, loss of bowel or bladder control    EXAM:   BP 96/58 (BP Location: Left arm, Patient Position: Sitting, Cuff Size: large)   Pulse 70   Temp 98.3 °F (36 2 days if not improving to monitor for dehydration.

## 2017-12-13 ENCOUNTER — LAB ENCOUNTER (OUTPATIENT)
Dept: LAB | Age: 67
End: 2017-12-13
Attending: INTERNAL MEDICINE
Payer: MEDICARE

## 2017-12-13 DIAGNOSIS — R19.7 DIARRHEA OF PRESUMED INFECTIOUS ORIGIN: ICD-10-CM

## 2017-12-13 PROCEDURE — 87493 C DIFF AMPLIFIED PROBE: CPT

## 2017-12-13 PROCEDURE — 87046 STOOL CULTR AEROBIC BACT EA: CPT

## 2017-12-13 PROCEDURE — 87045 FECES CULTURE AEROBIC BACT: CPT

## 2017-12-13 PROCEDURE — 87427 SHIGA-LIKE TOXIN AG IA: CPT

## 2017-12-13 PROCEDURE — 87338 HPYLORI STOOL AG IA: CPT

## 2017-12-15 ENCOUNTER — ANESTHESIA EVENT (OUTPATIENT)
Dept: SURGERY | Facility: HOSPITAL | Age: 67
DRG: 470 | End: 2017-12-15
Payer: MEDICARE

## 2017-12-18 ENCOUNTER — OFFICE VISIT (OUTPATIENT)
Dept: INTERNAL MEDICINE CLINIC | Facility: CLINIC | Age: 67
End: 2017-12-18

## 2017-12-18 VITALS
SYSTOLIC BLOOD PRESSURE: 102 MMHG | RESPIRATION RATE: 13 BRPM | BODY MASS INDEX: 31.98 KG/M2 | HEART RATE: 82 BPM | TEMPERATURE: 98 F | DIASTOLIC BLOOD PRESSURE: 64 MMHG | WEIGHT: 199 LBS | HEIGHT: 66 IN | OXYGEN SATURATION: 98 %

## 2017-12-18 DIAGNOSIS — R11.2 NON-INTRACTABLE VOMITING WITH NAUSEA, UNSPECIFIED VOMITING TYPE: ICD-10-CM

## 2017-12-18 DIAGNOSIS — Z79.4 TYPE 2 DIABETES MELLITUS WITHOUT COMPLICATION, WITH LONG-TERM CURRENT USE OF INSULIN (HCC): ICD-10-CM

## 2017-12-18 DIAGNOSIS — I10 ESSENTIAL HYPERTENSION: ICD-10-CM

## 2017-12-18 DIAGNOSIS — M17.0 PRIMARY OSTEOARTHRITIS OF BOTH KNEES: ICD-10-CM

## 2017-12-18 DIAGNOSIS — Z01.818 PREOPERATIVE EXAMINATION: Primary | ICD-10-CM

## 2017-12-18 DIAGNOSIS — E11.9 TYPE 2 DIABETES MELLITUS WITHOUT COMPLICATION, WITH LONG-TERM CURRENT USE OF INSULIN (HCC): ICD-10-CM

## 2017-12-18 PROCEDURE — 93000 ELECTROCARDIOGRAM COMPLETE: CPT | Performed by: INTERNAL MEDICINE

## 2017-12-18 PROCEDURE — 99214 OFFICE O/P EST MOD 30 MIN: CPT | Performed by: INTERNAL MEDICINE

## 2017-12-18 RX ORDER — ONDANSETRON 4 MG/1
4 TABLET, ORALLY DISINTEGRATING ORAL EVERY 8 HOURS PRN
Qty: 15 TABLET | Refills: 0 | Status: SHIPPED | OUTPATIENT
Start: 2017-12-18 | End: 2017-12-21

## 2017-12-18 NOTE — PROGRESS NOTES
Errol Salvador is a 78year old male who presents for a pre-operative physical exam.   Errol Salvador is scheduled for a right total knee arthroplasty procedure at [facility] on 12/22/17 performed by Dr Maty Reeves, who has requested that I provide mention of complication, not stated as uncontrolled around 2004   • Ulcer, esophagus 1/16   • Unspecified essential hypertension      Past Surgical History:   Procedure Laterality Date   • Colonoscopy,diagnostic N/A 2/2/2016    Procedure: Rubens Andrea mg total) by mouth 2 (two) times daily. , Disp: 60 tablet, Rfl: 0  •  MetFORMIN HCl 1000 MG Oral Tab, Take 1 tablet (1,000 mg total) by mouth 2 (two) times daily with meals. , Disp: 180 tablet, Rfl: 0  •  Glucose Blood (TRUE METRIX BLOOD GLUCOSE TEST) In Vit Patient has good functional status (>4 METS). No active anginal symptoms, no history of arrhythmias, coronary artery disease, valvular disease. EKG normal sinus rhythm.   Computer readout is old inferior infarct however to me it is unchanged from EKG from Anita Amor MD (Family Medicine)  Alis Rodriguez NP as Registered Nurse (Nurse Practitioner)  The patient indicates understanding of these issues and agrees to the plan.   The patient is asked to return to clinic in 2-3 months for follow up on chronic issues,

## 2017-12-18 NOTE — PATIENT INSTRUCTIONS
- Take 1/2 dose of Lantus (15 units instead of 30 units) on morning of surgery  - Ok to continue other medications  - Use ondansetron for nausea (1 tablet every 8 hours as needed). - Water and medications only on day of surgery.     It was a pleasure seei

## 2017-12-19 ENCOUNTER — OFFICE VISIT (OUTPATIENT)
Dept: ENDOCRINOLOGY CLINIC | Facility: CLINIC | Age: 67
End: 2017-12-19

## 2017-12-19 ENCOUNTER — LABORATORY ENCOUNTER (OUTPATIENT)
Dept: LAB | Facility: HOSPITAL | Age: 67
DRG: 470 | End: 2017-12-19
Attending: ORTHOPAEDIC SURGERY
Payer: MEDICARE

## 2017-12-19 ENCOUNTER — HOSPITAL ENCOUNTER (OUTPATIENT)
Dept: PHYSICAL THERAPY | Facility: HOSPITAL | Age: 67
Discharge: HOME | DRG: 470 | End: 2017-12-19
Attending: ORTHOPAEDIC SURGERY
Payer: MEDICARE

## 2017-12-19 VITALS
HEIGHT: 66 IN | WEIGHT: 199 LBS | BODY MASS INDEX: 31.98 KG/M2 | SYSTOLIC BLOOD PRESSURE: 100 MMHG | TEMPERATURE: 99 F | DIASTOLIC BLOOD PRESSURE: 68 MMHG

## 2017-12-19 DIAGNOSIS — E11.9 TYPE 2 DIABETES MELLITUS WITHOUT COMPLICATION, WITH LONG-TERM CURRENT USE OF INSULIN (HCC): Primary | ICD-10-CM

## 2017-12-19 DIAGNOSIS — M17.11 ARTHRITIS OF RIGHT KNEE: ICD-10-CM

## 2017-12-19 DIAGNOSIS — Z79.4 TYPE 2 DIABETES MELLITUS WITHOUT COMPLICATION, WITH LONG-TERM CURRENT USE OF INSULIN (HCC): Primary | ICD-10-CM

## 2017-12-19 PROCEDURE — 99214 OFFICE O/P EST MOD 30 MIN: CPT | Performed by: NURSE PRACTITIONER

## 2017-12-19 PROCEDURE — 86850 RBC ANTIBODY SCREEN: CPT

## 2017-12-19 PROCEDURE — 86900 BLOOD TYPING SEROLOGIC ABO: CPT

## 2017-12-19 PROCEDURE — 87081 CULTURE SCREEN ONLY: CPT

## 2017-12-19 PROCEDURE — 83036 HEMOGLOBIN GLYCOSYLATED A1C: CPT | Performed by: NURSE PRACTITIONER

## 2017-12-19 PROCEDURE — 86901 BLOOD TYPING SEROLOGIC RH(D): CPT

## 2017-12-19 RX ORDER — BLOOD-GLUCOSE METER
KIT MISCELLANEOUS
Qty: 100 STRIP | Refills: 2 | Status: SHIPPED | OUTPATIENT
Start: 2017-12-19 | End: 2018-01-16

## 2017-12-19 RX ORDER — CALCIUM CITRATE/VITAMIN D3 200MG-6.25
TABLET ORAL
Qty: 100 STRIP | Refills: 3 | Status: SHIPPED | OUTPATIENT
Start: 2017-12-19 | End: 2018-05-16

## 2017-12-19 RX ORDER — PEN NEEDLE, DIABETIC 30 GX3/16"
1 NEEDLE, DISPOSABLE MISCELLANEOUS 4 TIMES DAILY
Qty: 200 EACH | Refills: 2 | Status: SHIPPED | OUTPATIENT
Start: 2017-12-19 | End: 2018-06-12

## 2017-12-19 NOTE — PROGRESS NOTES
CC: Patient presents with:  Diabetes: follow up. pt did bring readings.       HISTORY:  Past Medical History:   Diagnosis Date   • Arthritis    • BPH (benign prostatic hypertrophy)    • Esophageal reflux    • Obesity    • Osteoarthritis    • STROKE     8 or SE: denies   Failed Meds: stated had been on insulin in past     Overall glucose control: improving today at 8.9% prior 11/12/17  A1C at 11.9%  Using True Metrix meter   Average Fasting glucose 110-139 mg/dl   Average post meal glucose 171-230 mg/dl 2  •  Glucose Blood (FREESTYLE LITE TEST) In Vitro Strip, Use three times daily, Disp: 100 strip, Rfl: 2  •  ondansetron 4 MG Oral Tablet Dispersible, Take 1 tablet (4 mg total) by mouth every 8 (eight) hours as needed for Nausea., Disp: 15 tablet, Rfl: 0 No open wounds noted. Psychiatric: Normal mood and affect.      Assessment and Plan:  Type 2 diabetes mellitus without complication, with long-term current use of insulin (hcc)  (primary encounter diagnosis): plan to continue levemir 34 units am and 30 uni

## 2017-12-19 NOTE — PATIENT INSTRUCTIONS
Levemir (charles) 34 unidades en la manana y 30 en la noche  Novolog (anaranjado) 2 unidades antes de lonche y ana  regresar despues de la cirujia 3 a 4 semanas y lo pondremos en pastillas.

## 2017-12-20 PROCEDURE — 86235 NUCLEAR ANTIGEN ANTIBODY: CPT | Performed by: INTERNAL MEDICINE

## 2017-12-21 ENCOUNTER — OFFICE VISIT (OUTPATIENT)
Dept: INTERNAL MEDICINE CLINIC | Facility: CLINIC | Age: 67
End: 2017-12-21

## 2017-12-21 VITALS
OXYGEN SATURATION: 91 % | WEIGHT: 200.5 LBS | BODY MASS INDEX: 32.22 KG/M2 | TEMPERATURE: 99 F | RESPIRATION RATE: 16 BRPM | HEART RATE: 88 BPM | HEIGHT: 66 IN | DIASTOLIC BLOOD PRESSURE: 68 MMHG | SYSTOLIC BLOOD PRESSURE: 110 MMHG

## 2017-12-21 DIAGNOSIS — R11.0 NAUSEA: Primary | ICD-10-CM

## 2017-12-21 PROCEDURE — 99214 OFFICE O/P EST MOD 30 MIN: CPT | Performed by: INTERNAL MEDICINE

## 2017-12-21 RX ORDER — PROCHLORPERAZINE MALEATE 5 MG/1
5 TABLET ORAL EVERY 8 HOURS PRN
Qty: 30 TABLET | Refills: 1 | Status: SHIPPED | OUTPATIENT
Start: 2017-12-21 | End: 2018-05-01

## 2017-12-21 NOTE — PATIENT INSTRUCTIONS
- Stop ondansetron. - Start prochloperazine (1 tablet every 8 hours as needed) for nausea  - Follow up with Unknown Jaegers. Prochlorperazine tablets  Brand Name: Mesfin Silver es isacc medicamento?   Hanny Profit ayuda a [employer] y Brittany · color amarillento de los ojos o de la piel  Efectos secundarios que, por lo general, no requieren atención médica (debe informarlos a conner médico o a conner profesional de la jett si persisten o si son molestos):  · dificultad para orinar  · dificultad para c Visite a conner médico o a conner profesional de la jett para chequear conner evolución periódicamente. Puede experimentar mareos o somnolencia.  No conduzca ni utilice maquinaria ni sharyn nada que le exija permanecer en estado de alerta hasta que sepa cómo le afecta

## 2017-12-21 NOTE — OR NURSING
12/19/17 nasal swab + MSSA. Faxed notice to Dr. Jessie Allison office and also called his office and spoke with Syd Espitia regarding above.

## 2017-12-21 NOTE — PROGRESS NOTES
Jeninfer Cotto is a 78year old male. HPI:   Patient presents with:  Nausea: x2 months ondansetron not working   Patient presents to discuss his persistent nausea. This is an ongoing problem, that has been going on for several months.   He has had signi 4/25/2016). Family: family history includes Diabetes in his sister; Other in his son and son. Social:  reports that he has never smoked. He has never used smokeless tobacco. He reports that he does not drink alcohol or use drugs.   Wt Readings from Last 6 Patient Care Team:  Josie Perez MD as PCP - General (Internal Medicine)  Rosas Leon NP as Registered Nurse (Nurse Practitioner)  The patient indicates understanding of these issues and agrees to the plan.     Jomar David MD

## 2017-12-22 ENCOUNTER — ANESTHESIA (OUTPATIENT)
Dept: SURGERY | Facility: HOSPITAL | Age: 67
DRG: 470 | End: 2017-12-22
Payer: MEDICARE

## 2017-12-22 ENCOUNTER — APPOINTMENT (OUTPATIENT)
Dept: GENERAL RADIOLOGY | Facility: HOSPITAL | Age: 67
DRG: 470 | End: 2017-12-22
Attending: ORTHOPAEDIC SURGERY
Payer: MEDICARE

## 2017-12-22 ENCOUNTER — HOSPITAL ENCOUNTER (INPATIENT)
Facility: HOSPITAL | Age: 67
LOS: 4 days | Discharge: NURSING, REHAB OR LONG-TERM CARE | DRG: 470 | End: 2017-12-26
Attending: ORTHOPAEDIC SURGERY | Admitting: ORTHOPAEDIC SURGERY
Payer: MEDICARE

## 2017-12-22 ENCOUNTER — SURGERY (OUTPATIENT)
Age: 67
End: 2017-12-22

## 2017-12-22 DIAGNOSIS — Z96.651 STATUS POST RIGHT KNEE REPLACEMENT: ICD-10-CM

## 2017-12-22 DIAGNOSIS — M17.11 ARTHRITIS OF RIGHT KNEE: Primary | ICD-10-CM

## 2017-12-22 PROCEDURE — 3E0T3BZ INTRODUCTION OF ANESTHETIC AGENT INTO PERIPHERAL NERVES AND PLEXI, PERCUTANEOUS APPROACH: ICD-10-PCS | Performed by: ANESTHESIOLOGY

## 2017-12-22 PROCEDURE — 73560 X-RAY EXAM OF KNEE 1 OR 2: CPT | Performed by: ORTHOPAEDIC SURGERY

## 2017-12-22 PROCEDURE — 0SRC0J9 REPLACEMENT OF RIGHT KNEE JOINT WITH SYNTHETIC SUBSTITUTE, CEMENTED, OPEN APPROACH: ICD-10-PCS | Performed by: ORTHOPAEDIC SURGERY

## 2017-12-22 DEVICE — IMPLANTABLE DEVICE: Type: IMPLANTABLE DEVICE | Site: KNEE | Status: FUNCTIONAL

## 2017-12-22 DEVICE — CEMENT BONE RADIOPAQ HOWMEDICA: Type: IMPLANTABLE DEVICE | Site: KNEE | Status: FUNCTIONAL

## 2017-12-22 DEVICE — PSN ALL POLY PAT PLY 32MM: Type: IMPLANTABLE DEVICE | Site: KNEE | Status: FUNCTIONAL

## 2017-12-22 DEVICE — PIN HEADED 48MM: Type: IMPLANTABLE DEVICE | Site: KNEE | Status: FUNCTIONAL

## 2017-12-22 RX ORDER — HYDROMORPHONE HYDROCHLORIDE 1 MG/ML
0.2 INJECTION, SOLUTION INTRAMUSCULAR; INTRAVENOUS; SUBCUTANEOUS EVERY 2 HOUR PRN
Status: DISPENSED | OUTPATIENT
Start: 2017-12-22 | End: 2017-12-24

## 2017-12-22 RX ORDER — DIPHENHYDRAMINE HYDROCHLORIDE 50 MG/ML
12.5 INJECTION INTRAMUSCULAR; INTRAVENOUS EVERY 4 HOURS PRN
Status: DISCONTINUED | OUTPATIENT
Start: 2017-12-22 | End: 2017-12-26

## 2017-12-22 RX ORDER — OXYCODONE HYDROCHLORIDE 10 MG/1
10 TABLET ORAL EVERY 4 HOURS PRN
Status: DISCONTINUED | OUTPATIENT
Start: 2017-12-22 | End: 2017-12-23

## 2017-12-22 RX ORDER — DEXTROSE MONOHYDRATE 25 G/50ML
50 INJECTION, SOLUTION INTRAVENOUS
Status: DISCONTINUED | OUTPATIENT
Start: 2017-12-22 | End: 2017-12-22 | Stop reason: HOSPADM

## 2017-12-22 RX ORDER — HYDROMORPHONE HYDROCHLORIDE 1 MG/ML
0.8 INJECTION, SOLUTION INTRAMUSCULAR; INTRAVENOUS; SUBCUTANEOUS EVERY 2 HOUR PRN
Status: ACTIVE | OUTPATIENT
Start: 2017-12-22 | End: 2017-12-24

## 2017-12-22 RX ORDER — HYDROMORPHONE HYDROCHLORIDE 1 MG/ML
INJECTION, SOLUTION INTRAMUSCULAR; INTRAVENOUS; SUBCUTANEOUS
Status: COMPLETED
Start: 2017-12-22 | End: 2017-12-22

## 2017-12-22 RX ORDER — CEFAZOLIN SODIUM/WATER 2 G/20 ML
2 SYRINGE (ML) INTRAVENOUS ONCE
Status: DISCONTINUED | OUTPATIENT
Start: 2017-12-22 | End: 2017-12-22 | Stop reason: HOSPADM

## 2017-12-22 RX ORDER — CYCLOBENZAPRINE HCL 5 MG
5 TABLET ORAL 3 TIMES DAILY PRN
Status: DISCONTINUED | OUTPATIENT
Start: 2017-12-22 | End: 2017-12-23

## 2017-12-22 RX ORDER — HYDROMORPHONE HYDROCHLORIDE 1 MG/ML
0.4 INJECTION, SOLUTION INTRAMUSCULAR; INTRAVENOUS; SUBCUTANEOUS EVERY 2 HOUR PRN
Status: DISPENSED | OUTPATIENT
Start: 2017-12-22 | End: 2017-12-24

## 2017-12-22 RX ORDER — POVIDONE-IODINE 10 MG/G
OINTMENT TOPICAL AS NEEDED
Status: DISCONTINUED | OUTPATIENT
Start: 2017-12-22 | End: 2017-12-22 | Stop reason: HOSPADM

## 2017-12-22 RX ORDER — ONDANSETRON 2 MG/ML
4 INJECTION INTRAMUSCULAR; INTRAVENOUS EVERY 4 HOURS PRN
Status: DISPENSED | OUTPATIENT
Start: 2017-12-22 | End: 2017-12-24

## 2017-12-22 RX ORDER — HYDROMORPHONE HYDROCHLORIDE 1 MG/ML
0.4 INJECTION, SOLUTION INTRAMUSCULAR; INTRAVENOUS; SUBCUTANEOUS EVERY 5 MIN PRN
Status: DISCONTINUED | OUTPATIENT
Start: 2017-12-22 | End: 2017-12-22 | Stop reason: HOSPADM

## 2017-12-22 RX ORDER — ACETAMINOPHEN 325 MG/1
TABLET ORAL
Status: COMPLETED
Start: 2017-12-22 | End: 2017-12-22

## 2017-12-22 RX ORDER — OXYCODONE HCL 10 MG/1
10 TABLET, FILM COATED, EXTENDED RELEASE ORAL
Status: COMPLETED | OUTPATIENT
Start: 2017-12-22 | End: 2017-12-22

## 2017-12-22 RX ORDER — SODIUM PHOSPHATE, DIBASIC AND SODIUM PHOSPHATE, MONOBASIC 7; 19 G/133ML; G/133ML
1 ENEMA RECTAL ONCE AS NEEDED
Status: DISCONTINUED | OUTPATIENT
Start: 2017-12-22 | End: 2017-12-26

## 2017-12-22 RX ORDER — SENNOSIDES 8.6 MG
17.2 TABLET ORAL NIGHTLY
Status: DISCONTINUED | OUTPATIENT
Start: 2017-12-22 | End: 2017-12-26

## 2017-12-22 RX ORDER — OXYCODONE HYDROCHLORIDE 15 MG/1
15 TABLET ORAL EVERY 4 HOURS PRN
Status: DISCONTINUED | OUTPATIENT
Start: 2017-12-22 | End: 2017-12-23

## 2017-12-22 RX ORDER — KETOROLAC TROMETHAMINE 30 MG/ML
15 INJECTION, SOLUTION INTRAMUSCULAR; INTRAVENOUS EVERY 6 HOURS
Status: COMPLETED | OUTPATIENT
Start: 2017-12-22 | End: 2017-12-23

## 2017-12-22 RX ORDER — DIPHENHYDRAMINE HCL 25 MG
25 CAPSULE ORAL EVERY 4 HOURS PRN
Status: DISCONTINUED | OUTPATIENT
Start: 2017-12-22 | End: 2017-12-26

## 2017-12-22 RX ORDER — SODIUM CHLORIDE, SODIUM LACTATE, POTASSIUM CHLORIDE, CALCIUM CHLORIDE 600; 310; 30; 20 MG/100ML; MG/100ML; MG/100ML; MG/100ML
INJECTION, SOLUTION INTRAVENOUS CONTINUOUS
Status: DISCONTINUED | OUTPATIENT
Start: 2017-12-22 | End: 2017-12-22 | Stop reason: HOSPADM

## 2017-12-22 RX ORDER — OXYCODONE HYDROCHLORIDE 5 MG/1
5 TABLET ORAL EVERY 4 HOURS PRN
Status: DISCONTINUED | OUTPATIENT
Start: 2017-12-22 | End: 2017-12-23

## 2017-12-22 RX ORDER — MELATONIN
325
Status: DISCONTINUED | OUTPATIENT
Start: 2017-12-23 | End: 2017-12-26

## 2017-12-22 RX ORDER — ACETAMINOPHEN 325 MG/1
650 TABLET ORAL 4 TIMES DAILY
Status: DISCONTINUED | OUTPATIENT
Start: 2017-12-22 | End: 2017-12-23

## 2017-12-22 RX ORDER — DIPHENHYDRAMINE HYDROCHLORIDE 50 MG/ML
25 INJECTION INTRAMUSCULAR; INTRAVENOUS ONCE AS NEEDED
Status: ACTIVE | OUTPATIENT
Start: 2017-12-22 | End: 2017-12-22

## 2017-12-22 RX ORDER — BISACODYL 10 MG
10 SUPPOSITORY, RECTAL RECTAL
Status: DISCONTINUED | OUTPATIENT
Start: 2017-12-22 | End: 2017-12-26

## 2017-12-22 RX ORDER — ACETAMINOPHEN 325 MG/1
650 TABLET ORAL ONCE
Status: DISCONTINUED | OUTPATIENT
Start: 2017-12-22 | End: 2017-12-22 | Stop reason: HOSPADM

## 2017-12-22 RX ORDER — POLYETHYLENE GLYCOL 3350 17 G/17G
17 POWDER, FOR SOLUTION ORAL DAILY PRN
Status: DISCONTINUED | OUTPATIENT
Start: 2017-12-22 | End: 2017-12-26

## 2017-12-22 RX ORDER — SODIUM CHLORIDE, SODIUM LACTATE, POTASSIUM CHLORIDE, CALCIUM CHLORIDE 600; 310; 30; 20 MG/100ML; MG/100ML; MG/100ML; MG/100ML
INJECTION, SOLUTION INTRAVENOUS CONTINUOUS
Status: DISCONTINUED | OUTPATIENT
Start: 2017-12-22 | End: 2017-12-23

## 2017-12-22 RX ORDER — METOCLOPRAMIDE HYDROCHLORIDE 5 MG/ML
10 INJECTION INTRAMUSCULAR; INTRAVENOUS EVERY 6 HOURS PRN
Status: ACTIVE | OUTPATIENT
Start: 2017-12-22 | End: 2017-12-24

## 2017-12-22 RX ORDER — CEFAZOLIN SODIUM/WATER 2 G/20 ML
2 SYRINGE (ML) INTRAVENOUS EVERY 8 HOURS
Status: COMPLETED | OUTPATIENT
Start: 2017-12-23 | End: 2017-12-23

## 2017-12-22 RX ORDER — DOCUSATE SODIUM 100 MG/1
100 CAPSULE, LIQUID FILLED ORAL 2 TIMES DAILY
Status: DISCONTINUED | OUTPATIENT
Start: 2017-12-22 | End: 2017-12-26

## 2017-12-22 NOTE — H&P
Preop H&P by Dr. Jimbo Conn on 12/18/17 was reviewed. No changes. Plan is to proceed with a right total knee arthroplasty for severe DJD right knee. Risks and benefits were discussed.

## 2017-12-22 NOTE — ANESTHESIA PREPROCEDURE EVALUATION
PRE-OP EVALUATION    Patient Name: Shell Brady    Pre-op Diagnosis: RIGHT KNEE OSTEOARTHRITIS      Procedure(s):  RIGHT TOTAL KNEE ARTHROPLASTY      Surgeon(s) and Role:     Oral Bloom MD - Primary     * Jj Murray MD    Pre-op bonita Robertson MetFORMIN HCl 1000 MG Oral Tab Take 1 tablet (1,000 mg total) by mouth 2 (two) times daily with meals. Disp: 180 tablet Rfl: 0   Lisinopril-Hydrochlorothiazide 20-12.5 MG Oral Tab Take 1 tablet by mouth once daily.  Disp: 90 tablet Rfl: 1       Allergies: >3 FB  TM distance: > 6 cm  Neck ROM: full Cardiovascular    Cardiovascular exam normal.         Dental    No notable dental history.          Pulmonary    Pulmonary exam normal.                 Other findings            ASA: 2   Plan: spinal and general  N

## 2017-12-23 PROCEDURE — 99232 SBSQ HOSP IP/OBS MODERATE 35: CPT | Performed by: HOSPITALIST

## 2017-12-23 RX ORDER — HYDROCODONE BITARTRATE AND ACETAMINOPHEN 5; 325 MG/1; MG/1
1-2 TABLET ORAL EVERY 6 HOURS PRN
Qty: 50 TABLET | Refills: 0 | Status: SHIPPED | OUTPATIENT
Start: 2017-12-23 | End: 2018-02-07

## 2017-12-23 RX ORDER — SODIUM POLYSTYRENE SULFONATE 15 G/60ML
15 SUSPENSION ORAL; RECTAL ONCE
Status: COMPLETED | OUTPATIENT
Start: 2017-12-23 | End: 2017-12-23

## 2017-12-23 RX ORDER — DEXTROSE MONOHYDRATE 25 G/50ML
50 INJECTION, SOLUTION INTRAVENOUS
Status: DISCONTINUED | OUTPATIENT
Start: 2017-12-23 | End: 2017-12-26

## 2017-12-23 RX ORDER — HYDROCODONE BITARTRATE AND ACETAMINOPHEN 10; 325 MG/1; MG/1
1 TABLET ORAL EVERY 4 HOURS PRN
Status: DISCONTINUED | OUTPATIENT
Start: 2017-12-24 | End: 2017-12-23

## 2017-12-23 RX ORDER — HYDROCODONE BITARTRATE AND ACETAMINOPHEN 10; 325 MG/1; MG/1
2 TABLET ORAL EVERY 4 HOURS PRN
Status: DISCONTINUED | OUTPATIENT
Start: 2017-12-24 | End: 2017-12-23

## 2017-12-23 RX ORDER — TRAMADOL HYDROCHLORIDE 50 MG/1
50 TABLET ORAL EVERY 6 HOURS PRN
Status: DISCONTINUED | OUTPATIENT
Start: 2017-12-23 | End: 2017-12-26

## 2017-12-23 RX ORDER — HYDROCODONE BITARTRATE AND ACETAMINOPHEN 10; 325 MG/1; MG/1
1 TABLET ORAL EVERY 4 HOURS PRN
Status: DISCONTINUED | OUTPATIENT
Start: 2017-12-23 | End: 2017-12-26

## 2017-12-23 RX ORDER — TIZANIDINE 2 MG/1
2 TABLET ORAL EVERY 6 HOURS PRN
Status: DISCONTINUED | OUTPATIENT
Start: 2017-12-23 | End: 2017-12-26

## 2017-12-23 RX ORDER — SODIUM CHLORIDE 9 MG/ML
INJECTION, SOLUTION INTRAVENOUS CONTINUOUS
Status: DISCONTINUED | OUTPATIENT
Start: 2017-12-23 | End: 2017-12-26

## 2017-12-23 RX ORDER — PANTOPRAZOLE SODIUM 40 MG/1
40 TABLET, DELAYED RELEASE ORAL
Status: DISCONTINUED | OUTPATIENT
Start: 2017-12-24 | End: 2017-12-26

## 2017-12-23 RX ORDER — ACETAMINOPHEN 325 MG/1
650 TABLET ORAL EVERY 6 HOURS PRN
Status: DISCONTINUED | OUTPATIENT
Start: 2017-12-23 | End: 2017-12-24

## 2017-12-23 RX ORDER — HYDROCODONE BITARTRATE AND ACETAMINOPHEN 10; 325 MG/1; MG/1
2 TABLET ORAL EVERY 4 HOURS PRN
Status: DISCONTINUED | OUTPATIENT
Start: 2017-12-23 | End: 2017-12-26

## 2017-12-23 NOTE — OCCUPATIONAL THERAPY NOTE
OCCUPATIONAL THERAPY EVALUATION - INPATIENT     Room Number: 359/359-A  Evaluation Date: 12/23/2017  Type of Evaluation: Initial  Presenting Problem: R TKA    Physician Order: IP Consult to Occupational Therapy  Reason for Therapy: ADL/IADL Dysfunction and None    Occupation/Status: retired  Hand Dominance: Right  Drives: No  Patient Regularly Uses: None    Prior Level of Function: Pt lives with spouse and required assistance with IADLS (transportation, shopping, cooking/cleaning).   Pt used Rw for functional up to 142 (seated at EOB during BLE ex)  Blood Pressure: 96/72 at EOB    [address] [facility] [facility] ‘6-Clicks’ Inpatient Daily Activity Short Form  How much help from another person does the patient currently need…  -   Putting on and ta 74.7% impairment in basic ADL and raw score 10/ 24 (research showing that score of 13.95 indicating SNF/IRF, score of 11.25 indicating LTAC).       In this OT evaluation patient presents with the following performance deficits: arousal and attention to task 5x/week  Number of Visits to Meet Established Goals: 5    ADL Goals  Pt will perform a grooming task seated with s/u   Pt will perform LB dressing w/ moderate assistance and AE as needed   Pt will perform toileting: with moderate assistance    Functional T

## 2017-12-23 NOTE — CONSULTS
[facility]  CONSULT     Keltoncharity Simms Patient Status:  Inpatient    1950 MRN [id number]   [facility] 3SW-A Attending Nallely Barrera MD   Hosp Day # 1 PCP Parish East MD     Reason for consult: medical management medications on file prior to encounter. Current Outpatient Prescriptions on File Prior to Encounter:  Omeprazole 40 MG Oral Capsule Delayed Release Take 1 capsule (40 mg total) by mouth 2 (two) times daily.  Disp: 60 capsule Rfl: 11   MetFORMIN HCl 1000 M dressings clean/dry, drain with sanguinous discharge   Integument: No rashes or lesions. Psychiatric: Appropriate mood and affect. Diagnostic Data:      Labs:  No results for input(s): WBC, HGB, MCV, PLT, BAND, INR in the last 72 hours.     Invalid i

## 2017-12-23 NOTE — PLAN OF CARE
Recd in room - alert and oriented x 4, understand all questions , speaking in [address] , pain score at # 7/10, rt knee ace wrap dry/intact , still weak on dorsiflexion and plantarflexion . scd's on left , CPM started at 50 degrees , I/s at 500 vol.nicolas cat

## 2017-12-23 NOTE — PROGRESS NOTES
Bladder scan again with 219 ml. Patient had not void since nicolas was remove at 0630. Denies sensation to urinate. Dr. Richard Carrera notified with order to do straight cath x 1. Straight cath done with 200 ml of dark urine output.   Received order to give 500

## 2017-12-23 NOTE — PROGRESS NOTES
128 Jordan Tomlinson Patient Status:  Inpatient    1950 MRN [id number]   [facility] 3SW-A Attending Padmini Coto MD   Hosp Day # 1 PCP Franki Rivera MD     Subjective:  Post-Operative Day: 1 Status Post right Total

## 2017-12-23 NOTE — PHYSICAL THERAPY NOTE
PHYSICAL THERAPY TREATMENT NOTE - INPATIENT    Room Number: 359/359-A     Session: 2  Number of Visits to Meet Established Goals: 4    Presenting Problem: s/p R TKA    Problem List  Active Problems:    Arthritis of right knee    Atelectasis      Past Medi Static Standing: Poor +  Dynamic Standing: Poor +    ACTIVITY TOLERANCE  O2 Saturation: 97%  Room air  Heart Rate: 563    AM-PAC '6-Clicks' INPATIENT SHORT FORM - BASIC MOBILITY  How much difficulty does the patient currently have. ..  -   Turning over in Sets   1     Patient End of Session: In bed;Needs met;Call light within reach; All patient questions and concerns addressed; Family present;SCDs in place    ASSESSMENT   Pt demonstrates improved level of alertness and participation in PM session today.  Pt

## 2017-12-23 NOTE — BRIEF OP NOTE
Pre-Operative Diagnosis: RIGHT KNEE OSTEOARTHRITIS       Post-Operative Diagnosis: RIGHT KNEE OSTEOARTHRITIS       Procedure Performed:   Procedure(s):  RIGHT TOTAL KNEE ARTHROPLASTY      Surgeon(s) and Role:     Gilbert Mcmullen MD     * Parminder Simon

## 2017-12-23 NOTE — PROGRESS NOTES
Post Op Day 1 Ortho Note    Status Post Nerve Block:  Type of Nerve Block: Right adductor canal  Single Injection Nerve Block    Post op review: No evidence of immediate block related complications, No paresthesia noted, Able to lift leg(s), Able to planta

## 2017-12-23 NOTE — PROGRESS NOTES
[facility]  Progress Note     Christine Ruiz Patient Status:  Inpatient    1950 MRN [id number]   [facility] 3SW-A Attending Gaudencio Clark MD   Hosp Day # 1 PCP Cheyenne Mcgrath MD     Chief Complaint: Medical management (TORADOL) injection  15 mg Intravenous Q6H       ASSESSMENT / PLAN:     1. S/p right TKR  1. Management per ortho  2. PT/OT  3. Pain control  2. DMII  1. levemir and SSI  3. Expected acute blood loss anemia  1. Monitor  4. Hyponatremia  1. IVF  5. JANIS  1.

## 2017-12-23 NOTE — CM/SW NOTE
12/23/17 1600   CM/SW Referral Data   Referral Source Physician   Reason for Referral Discharge planning   Informant Children   Pertinent Medical Hx   Primary Care Physician Name dr Keri Malone Drug/Alcohol Use n   [id number] [address] [place] [address]

## 2017-12-23 NOTE — OPERATIVE REPORT
[facility]    PATIENT'S NAME: Luisito Maya   ATTENDING PHYSICIAN: Anushka Valerio M.D. OPERATING PHYSICIAN: Anushka Valerio M.D.    PATIENT ACCOUNT#:   [de-identified]    LOCATION:  [address] [place] [address]  MEDICAL RECORD #:   [id number]       DATE OF BIRTH: midline. The remnant of the anterior cruciate ligament was excised. Remnants of the medial and lateral menisci were excised. The intramedullary brooklynn was placed. The cutting guide for the distal femur was then set for 5 degrees of valgus.   After this was prepared. The tibia was then placed and the peg holes were then drilled for the porous-coated peg tibia insert. After this was completed, the trials were removed. The bone cement was mixed. The tibial tray was then cemented into place.   Excess cement w

## 2017-12-23 NOTE — ANESTHESIA POSTPROCEDURE EVALUATION
128 [facility] Patient Status:  Surgery Admit   Age/Gender 78year old male MRN [id number]   Location [address] [facility] Attending Ivanna Shaw MD   Hosp Day # 0 PCP Iggy Salazar MD       Anesthesia Post-o

## 2017-12-23 NOTE — PROGRESS NOTES
Patient no urine output, denies sensation to void. Lower abdomen non-distended. Bladder scan shows 136 ml. Continue with IV fluids, encouraged patient to drink more water. Will recheck bladder again in an hour.

## 2017-12-23 NOTE — PROGRESS NOTES
Dr. Elida Courtney is aware of admission . Family agreed to use  if needed . Daughter Padmini Sandhu - gave medical information password also given .

## 2017-12-23 NOTE — PHYSICAL THERAPY NOTE
PHYSICAL THERAPY EVALUATION - INPATIENT      Room Number: 359/359-A  Evaluation Date: 12/23/2017  Type of Evaluation: Initial   Physician Order: PT Eval and Treat    Presenting Problem: s/p R TKA  Reason for Therapy: Mobility Dysfunction and Discharge ENDOSCOPY,BIOPSY N/A      Comment:  Erosive esophagitis (no EoE/malignancy),                schatzki's ring, hiatal hernia    HOME SITUATION  Type of Home: House   Home Layout: One level  Stairs to Enter : 0     Stairs to Bedroom: 0       Lives With: [employer] of bed    Lower extremity strength is within functional limits except BLE strength grossly 3+/5, limited ability to participate in formal testing    BALANCE  Static Sitting: Poor  Dynamic Sitting: Poor -  Static Standing: Not tested  Dynamic Standing: Not edge of bed. Sitting tolerance limited due to report of dizziness and nausea as well as HR increase to 142 and BP drop to 96/72.           Exercise/Education Provided:  Bed mobility  Body mechanics  Functional activity tolerated  Gait training  Manual thera GOALS    Goal #1 Patient is able to demonstrate supine - sit EOB @ level: moderate assistance     Goal #2 Patient is able to demonstrate transfers EOB to/from [facility] at assistance level: moderate assistance     Goal #3 Patient is able to ambulate 50 feet with

## 2017-12-23 NOTE — PLAN OF CARE
PAIN - ADULT    • Verbalizes/displays adequate comfort level or patient's stated pain goal Progressing        Patient very sleepy this morning, it could be related to oral pain medication.   Right knee with ace wrap dressing clean, dry and intact, lee maya

## 2017-12-24 ENCOUNTER — APPOINTMENT (OUTPATIENT)
Dept: GENERAL RADIOLOGY | Facility: HOSPITAL | Age: 67
DRG: 470 | End: 2017-12-24
Attending: HOSPITALIST
Payer: MEDICARE

## 2017-12-24 PROCEDURE — 99232 SBSQ HOSP IP/OBS MODERATE 35: CPT | Performed by: HOSPITALIST

## 2017-12-24 PROCEDURE — 74000 XR ABDOMEN (1 VIEW) (CPT=74000): CPT | Performed by: HOSPITALIST

## 2017-12-24 RX ORDER — HYDROMORPHONE HYDROCHLORIDE 1 MG/ML
1 INJECTION, SOLUTION INTRAMUSCULAR; INTRAVENOUS; SUBCUTANEOUS EVERY 2 HOUR PRN
Status: DISCONTINUED | OUTPATIENT
Start: 2017-12-24 | End: 2017-12-26

## 2017-12-24 RX ORDER — ACETAMINOPHEN 10 MG/ML
1000 INJECTION, SOLUTION INTRAVENOUS EVERY 6 HOURS PRN
Status: DISCONTINUED | OUTPATIENT
Start: 2017-12-24 | End: 2017-12-26

## 2017-12-24 RX ORDER — TROLAMINE SALICYLATE 10 G/100G
CREAM TOPICAL EVERY 6 HOURS PRN
Status: DISCONTINUED | OUTPATIENT
Start: 2017-12-24 | End: 2017-12-26

## 2017-12-24 RX ORDER — HYDROMORPHONE HYDROCHLORIDE 1 MG/ML
0.5 INJECTION, SOLUTION INTRAMUSCULAR; INTRAVENOUS; SUBCUTANEOUS EVERY 2 HOUR PRN
Status: DISCONTINUED | OUTPATIENT
Start: 2017-12-24 | End: 2017-12-26

## 2017-12-24 NOTE — PROGRESS NOTES
Acute Pain Service    Post Op Day 2 Ortho Note    Assessed patient in chair. Patient states surgical pain is well managed with medications; denies itching/nausea/dizziness. Patient able to bear weight on sx leg; equal sensation in BLE.  Pt and family req

## 2017-12-24 NOTE — PROGRESS NOTES
[facility]  Progress Note     Renita Ngo Patient Status:  Inpatient    1950 MRN [id number]   [facility] 3SW-A Attending Rossi Krishnan MD   Hosp Day # 2 PCP Beatriz Harada, MD     Chief Complaint: Medical management mg Oral QAM AC   • Senna  17.2 mg Oral Nightly   • docusate sodium  100 mg Oral BID   • ferrous sulfate  325 mg Oral Daily with breakfast   • rivaroxaban  10 mg Oral Q24H       ASSESSMENT / PLAN:     1. S/p right TKR  1. Management per ortho  2. PT/OT  3.

## 2017-12-24 NOTE — PROGRESS NOTES
12/23/17 1829   Clinical Encounter Type   Visited With Patient and family together  (Multiple family members at bedside)   Routine Visit (Request for consult/communion)   Continue Visiting No   Patient's Supportive Strategies/Resources  offered

## 2017-12-24 NOTE — PHYSICAL THERAPY NOTE
PHYSICAL THERAPY TREATMENT NOTE - INPATIENT    Room Number: 359/359-A     Session:    Number of Visits to Meet Established Goals: 4    Presenting Problem: s/p R TKA    Problem List  Active Problems:    Arthritis of right knee    Atelectasis    Nausea    O Static Sitting: Fair  Dynamic Sitting: Fair           Static Standing: Fair -  Dynamic Standing: Fair -    ACTIVITY TOLERANCE  No shortness of breath    AM-PAC '6-Clicks' INPATIENT SHORT FORM - BASIC M concerns addressed;SCDs in place; Ice applied; Family present    ASSESSMENT   Pt needs a lot of encouragement & cues during the session.   Pain continues to be a major limiting factor at this time; explained & discussed extensively again with pt & family need

## 2017-12-24 NOTE — PLAN OF CARE
Voided only 50 cc , stated its hard to urinate , c/o burning sensation when voiding . Encouraged oral fluids , IVF continues , bladder scan earlier noted 180 cc . Will continue to monitor .

## 2017-12-24 NOTE — OCCUPATIONAL THERAPY NOTE
OCCUPATIONAL THERAPY TREATMENT NOTE - INPATIENT     Room Number: 359/359-A  Session: 1   Number of Visits to Meet Established Goals: 5    Presenting Problem: s/p R TKA on 12/22/17     History related to current admission: Pt is 78year old male admitted on (Comment) (ice )     ACTIVITY TOLERANCE  O2 Saturation: 95%  Room air   BP sitting EOB: 104/68  BP sitting in chair: 99/56  BP sitting in chair after 5 mins: 91/51  RN immediately informed of BP readings      ACTIVITIES OF DAILY LIVING ASSESSMENT  AM-PAC ‘ session/findings; All patient questions and concerns addressed; Ice applied;SCDs in place    ASSESSMENT   Pt is a 79 y/o M admitted to [address] [place] [address] with an admitting diagnosis of s/p R TKA on 12/22/17.  Pt was previously functioning ind PROGRESSING   Pt will complete sit-stand transfers w/ moderate assist  PROGRESSING   Pt will complete BSC with mod assist  PROGRESSING

## 2017-12-24 NOTE — PROGRESS NOTES
Voided 150 cc this time . Dressing changed to Rt knee , staples are intact , scant amount of serosangenous  Drainage noted . Ice pack placed .

## 2017-12-24 NOTE — PROGRESS NOTES
128 Jordan Tomlinson Patient Status:  Inpatient    1950 MRN [id number]   [facility] 3SW-A Attending Gaudencio Clark MD   Hosp Day # 2 PCP Cheyenne Mcgrath MD     Subjective:  Post-Operative Day: 2 Status Post right Total

## 2017-12-24 NOTE — CM/SW NOTE
SW spoke with pt's daughter's both named Dipti Cali. Family has chosen [facility] [facility] for [place], [facility] referral sent.

## 2017-12-25 ENCOUNTER — APPOINTMENT (OUTPATIENT)
Dept: GENERAL RADIOLOGY | Facility: HOSPITAL | Age: 67
DRG: 470 | End: 2017-12-25
Attending: INTERNAL MEDICINE
Payer: MEDICARE

## 2017-12-25 ENCOUNTER — APPOINTMENT (OUTPATIENT)
Dept: ULTRASOUND IMAGING | Facility: HOSPITAL | Age: 67
DRG: 470 | End: 2017-12-25
Attending: INTERNAL MEDICINE
Payer: MEDICARE

## 2017-12-25 PROCEDURE — 99232 SBSQ HOSP IP/OBS MODERATE 35: CPT | Performed by: INTERNAL MEDICINE

## 2017-12-25 PROCEDURE — 74000 XR ABDOMEN (KUB) (1 AP VIEW)  (CPT=74000): CPT | Performed by: INTERNAL MEDICINE

## 2017-12-25 PROCEDURE — 93971 EXTREMITY STUDY: CPT | Performed by: INTERNAL MEDICINE

## 2017-12-25 NOTE — PROGRESS NOTES
128 Jordan Tomlinson Patient Status:  Inpatient    1950 MRN [id number]   [facility] 3SW-A Attending Rustam Nguyen MD   Hosp Day # 3 PCP Umesh Brown MD     Subjective:  Post-Operative Day: 3 Status Post right Total

## 2017-12-25 NOTE — PLAN OF CARE
GASTROINTESTINAL - ADULT    • Minimal or absence of nausea and vomiting Not Progressing    • Maintains or returns to baseline bowel function Not Progressing        Impaired Activities of Daily Living    • Achieve highest/safest level of independence in teresa

## 2017-12-25 NOTE — PLAN OF CARE
Results of KUB noted - possible ileus vs SBO. Dr. Jovany Lea notified, pt NPO. If +emesis may need NG. Will monitor.

## 2017-12-25 NOTE — PROGRESS NOTES
[facility]  Progress Note     Jesús Rockwell Patient Status:  Inpatient    1950 MRN [id number]   [facility] 3SW-A Attending Lila Chappell MD   Hosp Day # 3 PCP Bravo Liang MD     Chief Complaint: Medical management 17.2 mg Oral Nightly   • docusate sodium  100 mg Oral BID   • ferrous sulfate  325 mg Oral Daily with breakfast   • rivaroxaban  10 mg Oral Q24H       ASSESSMENT / PLAN:     1. S/p right TKR  1. Management per ortho  2. PT/OT  3. Pain control  2.  Abdominal

## 2017-12-25 NOTE — PHYSICAL THERAPY NOTE
PHYSICAL THERAPY HIP TREATMENT NOTE - INPATIENT      Room Number: 679/895-W     Session: 5  Number of Visits to Meet Established Goals: 4    Presenting Problem: S/p Right TKA on 12/22/17    Problem List  Active Problems:    Arthritis of right knee    Atele mechanics;Breathing techniques;Relaxation;Repositioning    BALANCE  Static Sitting: Fair +  Dynamic Sitting: Fair  Static Standing: Fair -  Dynamic Standing: Poor +  ACTIVITY TOLERANCE  No shortness of breath    AM-PAC '6-Clicks' INPATIENT SHORT FORM - BAS session.     Exercises    Ankle Pumps 15 reps   Quad Sets 15 reps   Glut Sets 15 reps   Hip Abd/Add 15 reps   Heel slides 15 reps   Saq 15 reps   Sitting Knee Extension 15 reps   Standing heel/toe raises 15 reps   Hamstring Curls 0 reps   Forward, back step

## 2017-12-26 VITALS
DIASTOLIC BLOOD PRESSURE: 60 MMHG | WEIGHT: 198.88 LBS | RESPIRATION RATE: 16 BRPM | HEART RATE: 71 BPM | SYSTOLIC BLOOD PRESSURE: 116 MMHG | OXYGEN SATURATION: 97 % | TEMPERATURE: 98 F | BODY MASS INDEX: 31.96 KG/M2 | HEIGHT: 66 IN

## 2017-12-26 PROCEDURE — 99232 SBSQ HOSP IP/OBS MODERATE 35: CPT | Performed by: INTERNAL MEDICINE

## 2017-12-26 RX ORDER — POLYETHYLENE GLYCOL 3350 17 G/17G
17 POWDER, FOR SOLUTION ORAL DAILY
Status: DISCONTINUED | OUTPATIENT
Start: 2017-12-26 | End: 2017-12-26

## 2017-12-26 RX ORDER — ACETAMINOPHEN 500 MG
1000 TABLET ORAL EVERY 6 HOURS PRN
Status: DISCONTINUED | OUTPATIENT
Start: 2017-12-26 | End: 2017-12-26

## 2017-12-26 NOTE — PROGRESS NOTES
NURSING DISCHARGE NOTE    Discharged Rehab facility via Ambulance. Accompanied by Family member and Support staff  Belongings Taken by patient/family. Report given to Stefany Conteh @ [facility].  All paperwork taken by ambulance including rx for Reida Situ a

## 2017-12-26 NOTE — CM/SW NOTE
Spoke with Jane Doyle at [address]. Pt is accepted today with a d/c time of 1:30PM.      SW spoke with pt, wife, and dtr through , Anisha Burrell (their request), re: above. Pt will transport via ambulance.   Dtr confirmed that pt only has

## 2017-12-26 NOTE — PROGRESS NOTES
[facility] Note: Route Optimization for Acetaminophen (OFIRMEV)    Patient is currently on Acetaminophen (OFIRMEV) 1000 mg IV every 6 hours as needed for fever.    The patient meets the criteria to convert to the oral equivalent as established by the IV t

## 2017-12-26 NOTE — PLAN OF CARE
GASTROINTESTINAL - ADULT    • Minimal or absence of nausea and vomiting Completed          GASTROINTESTINAL - ADULT    • Maintains or returns to baseline bowel function Not Progressing        Impaired Activities of Daily Living    • Achieve highest/safest

## 2017-12-26 NOTE — PROGRESS NOTES
128 Jordan Tomlinson Patient Status:  Inpatient    1950 MRN [id number]   [facility] 3SW-A Attending Dion Kaplan MD   Hosp Day # 4 PCP Amanda Bower MD     Subjective:  Post-Operative Day: 4 Status Post left Total

## 2017-12-26 NOTE — PLAN OF CARE
Repeat KUB results called into Dr. Mildred Melchor, bowel sounds +, Dulcolax suppository given x 1, ok to start clear liquids, Doppler US neg. for DVT.

## 2017-12-26 NOTE — OCCUPATIONAL THERAPY NOTE
OCCUPATIONAL THERAPY TREATMENT NOTE - INPATIENT     Room Number: 365/365-A  Session: 2/5  Number of Visits to Meet Established Goals: 5    Presenting Problem: s/p R TKA on 12/22/17     History related to current admission:    Pt is 78year old male admitte below knee, R)  Management Techniques: Relaxation;Repositioning     ACTIVITY TOLERANCE  Room air  No shortness of breath  Blood Pressure: 133/73    ACTIVITIES OF DAILY LIVING ASSESSMENT  AM-PAC ‘6-Clicks’ Inpatient Daily Activity Short Form  How much help due to poor tolerance to weightbearing on RLE. Two person assistance needed for transfers and mobility.    The AM-KENNETH ' '6-Clicks' Inpatient Daily Activity Short Form was completed and  this patient  is demonstrating a 54.46% degree impairment in activities

## 2017-12-26 NOTE — PHYSICAL THERAPY NOTE
PHYSICAL THERAPY HIP TREATMENT NOTE - INPATIENT      Room Number: 491/656-M     Session: 5  Number of Visits to Meet Established Goals: 4    Presenting Problem: S/p Right TKA on 12/22/17    Problem List  Active Problems:    Arthritis of right knee    Atele techniques;Relaxation;Repositioning    BALANCE  Static Sitting: Fair +  Dynamic Sitting: Fair  Static Standing: Fair -  Dynamic Standing: Poor +  ACTIVITY TOLERANCE  No shortness of breath    AM-PAC '6-Clicks' INPATIENT SHORT FORM - BASIC MOBILITY  How muc raises 20   Hamstring Curls 20   Forward, back steps 0   Short Squats 0     Comments:  Pt participated in group session, tolerance was good.  was present yes   is a wife & daughter    Patient End of Session: Up in chair; With [facility] staff;Needs met;C Goal #5     Goal #6     Goal Comments: Goals established on 12/23/2017; goal Ongoing 12/26/17

## 2017-12-26 NOTE — PROGRESS NOTES
[facility]  Progress Note     Eryn Ca Patient Status:  Inpatient    1950 MRN [id number]   [facility] 3SW-A Attending Dio Klein MD   Hosp Day # 4 PCP Vale Trejo MD     Chief Complaint: Medical management Oral Nightly   • docusate sodium  100 mg Oral BID   • ferrous sulfate  325 mg Oral Daily with breakfast   • rivaroxaban  10 mg Oral Q24H       ASSESSMENT / PLAN:     1. S/p right TKR  1. Management per ortho  2. PT/OT  3. Pain control  2.  Abdominal pain/na

## 2017-12-26 NOTE — PLAN OF CARE
GASTROINTESTINAL - ADULT    • Maintains or returns to baseline bowel function Adequate for Discharge        Impaired Activities of Daily Living    • Achieve highest/safest level of independence in self care Adequate for Discharge        Impaired Functional

## 2017-12-27 ENCOUNTER — LAB ENCOUNTER (OUTPATIENT)
Dept: LAB | Age: 67
End: 2017-12-27
Attending: NURSE PRACTITIONER
Payer: COMMERCIAL

## 2017-12-27 DIAGNOSIS — Z96.651 PRESENCE OF RIGHT ARTIFICIAL KNEE JOINT: Primary | ICD-10-CM

## 2017-12-27 PROCEDURE — 80053 COMPREHEN METABOLIC PANEL: CPT

## 2017-12-27 PROCEDURE — 85025 COMPLETE CBC W/AUTO DIFF WBC: CPT

## 2017-12-27 PROCEDURE — 83735 ASSAY OF MAGNESIUM: CPT

## 2017-12-27 NOTE — CM/SW NOTE
12/27/17 0800   Discharge disposition   Discharged to: Skilled Nurs   Name of [address] [facility]   Patient is Discharged to a [address]   Discharge transportation [employer]

## 2017-12-28 ENCOUNTER — PATIENT OUTREACH (OUTPATIENT)
Dept: CASE MANAGEMENT | Age: 67
End: 2017-12-28

## 2017-12-28 ENCOUNTER — SNF VISIT (OUTPATIENT)
Dept: INTERNAL MEDICINE CLINIC | Age: 67
End: 2017-12-28

## 2017-12-28 VITALS
DIASTOLIC BLOOD PRESSURE: 79 MMHG | WEIGHT: 201.63 LBS | BODY MASS INDEX: 33 KG/M2 | TEMPERATURE: 99 F | SYSTOLIC BLOOD PRESSURE: 112 MMHG | RESPIRATION RATE: 16 BRPM | OXYGEN SATURATION: 98 % | HEART RATE: 69 BPM

## 2017-12-28 DIAGNOSIS — M17.11 ARTHRITIS OF RIGHT KNEE: ICD-10-CM

## 2017-12-28 DIAGNOSIS — E11.9 TYPE 2 DIABETES MELLITUS WITHOUT COMPLICATION, WITH LONG-TERM CURRENT USE OF INSULIN (HCC): ICD-10-CM

## 2017-12-28 DIAGNOSIS — Z79.4 TYPE 2 DIABETES MELLITUS WITHOUT COMPLICATION, WITH LONG-TERM CURRENT USE OF INSULIN (HCC): ICD-10-CM

## 2017-12-28 DIAGNOSIS — R52 PAIN: ICD-10-CM

## 2017-12-28 DIAGNOSIS — Z98.890 S/P RIGHT KNEE SURGERY: ICD-10-CM

## 2017-12-28 DIAGNOSIS — I10 ESSENTIAL HYPERTENSION: Primary | ICD-10-CM

## 2017-12-28 DIAGNOSIS — Z74.09 IMPAIRED FUNCTIONAL MOBILITY AND ACTIVITY TOLERANCE: ICD-10-CM

## 2017-12-28 DIAGNOSIS — M17.0 PRIMARY OSTEOARTHRITIS OF BOTH KNEES: ICD-10-CM

## 2017-12-28 PROCEDURE — 99310 SBSQ NF CARE HIGH MDM 45: CPT | Performed by: NURSE PRACTITIONER

## 2017-12-28 PROCEDURE — 99356 PROLONGED SERV,INPATIENT,1ST HR: CPT | Performed by: NURSE PRACTITIONER

## 2017-12-28 RX ORDER — LIDOCAINE 50 MG/G
1 PATCH TOPICAL EVERY 24 HOURS
COMMUNITY
Start: 2017-12-28 | End: 2018-02-07

## 2017-12-28 NOTE — PROGRESS NOTES
Renita Ngo  : 1950  Age 78year old  male patient is admitted to Facility: The [address] [place] [address] at [facility] for right total knee arthroplasty and impaired functional mobility.     [address] [place] [address] Drive date:  17  Discharge date to [facility]:   EoE/malignancy),                schatzki's ring, hiatal hernia  Family History   Problem Relation Age of Onset   • Diabetes Sister    • Other [OTHER] Son    • Other [OTHER] Son      Smoking status: Never Smoker capsule Rfl: 11   MetFORMIN HCl 1000 MG Oral Tab Take 1 tablet (1,000 mg total) by mouth 2 (two) times daily with meals.  Disp: 180 tablet Rfl: 0   Glucose Blood (FREESTYLE TEST) In Vitro Strip Medicare Only: If on oral medications, test blood glucose once suspicious lesions, pink, warm, dry;+right hand and left inner elbow and right lower leg bruises; left chin scar and left shin scar  WOUND: +right knee, 15 staples covered with dressing  EYES: PERRLA, EOMI, sclera anicteric, conjunctiva normal; there is no mg/dL 0.8   Magnesium, Serum Latest Ref Range: 1.7 - 3.0 mg/dL 1.8   TOTAL PROTEIN Latest Ref Range: 6.1 - 8.3 g/dL 6.3   Albumin Latest Ref Range: 3.5 - 4.8 g/dL 2.4 (L)   WBC Latest Ref Range: 4.0 - 13.0 x10(3) uL 6.7   Hemoglobin Latest Ref Range: 13.0 days  10. Anticipated discharge date of 1/9/18  11. F/U with Marisabel Batres in one week  12. F/U with Dr. Rafa Tim in 2 weeks  15. F/U with Dr. Jagdish Egan on 1/3/18  14. F/U with Raine Bolanos on 1/22/18  15.  F/U with Dr. Randi Johnson on 2/26/18    Peptic Ulcer/GERD

## 2017-12-30 ENCOUNTER — SNF ADMIT/H&P (OUTPATIENT)
Dept: FAMILY MEDICINE CLINIC | Facility: CLINIC | Age: 67
End: 2017-12-30

## 2017-12-30 DIAGNOSIS — N40.0 BENIGN PROSTATIC HYPERPLASIA WITHOUT LOWER URINARY TRACT SYMPTOMS: ICD-10-CM

## 2017-12-30 DIAGNOSIS — E66.9 OBESITY (BMI 30-39.9): ICD-10-CM

## 2017-12-30 DIAGNOSIS — I10 ESSENTIAL HYPERTENSION: ICD-10-CM

## 2017-12-30 DIAGNOSIS — K56.7 ILEUS (HCC): ICD-10-CM

## 2017-12-30 DIAGNOSIS — K21.9 GASTROESOPHAGEAL REFLUX DISEASE WITHOUT ESOPHAGITIS: ICD-10-CM

## 2017-12-30 DIAGNOSIS — Z91.81 AT RISK FOR FALLING: ICD-10-CM

## 2017-12-30 DIAGNOSIS — Z79.4 TYPE 2 DIABETES MELLITUS WITHOUT COMPLICATION, WITH LONG-TERM CURRENT USE OF INSULIN (HCC): ICD-10-CM

## 2017-12-30 DIAGNOSIS — K59.00 CONSTIPATION, UNSPECIFIED CONSTIPATION TYPE: ICD-10-CM

## 2017-12-30 DIAGNOSIS — M17.11 ARTHRITIS OF RIGHT KNEE: ICD-10-CM

## 2017-12-30 DIAGNOSIS — Z96.651 S/P TOTAL KNEE REPLACEMENT, RIGHT: Primary | ICD-10-CM

## 2017-12-30 DIAGNOSIS — K27.9 PEPTIC ULCER DISEASE: ICD-10-CM

## 2017-12-30 DIAGNOSIS — E11.9 TYPE 2 DIABETES MELLITUS WITHOUT COMPLICATION, WITH LONG-TERM CURRENT USE OF INSULIN (HCC): ICD-10-CM

## 2017-12-30 PROCEDURE — 99305 1ST NF CARE MODERATE MDM 35: CPT | Performed by: FAMILY MEDICINE

## 2017-12-30 NOTE — PROGRESS NOTES
19 Kimberlee Sanchez Author: Severino Noriega MD     1950 MRN [id number]   [facility]  Admission 17      Last Hospital Discharge 17 PCP Marlene Nugent 15 of Discharge 17       Date of Admission: 1 improved. Patient denies any shortness of breath, denies chest pain and denies any recent fevers or chills. Patient reports no urinary complaints and denies headaches or visual disturbances. Patient denies any abdominal pain at this time.  Patient has glucose once a day in the morning before breakfastand for signs, symptoms of hypoglycemia or as ordered by physician   FREESTYLE LANCETS Does not apply Misc Medicare Only: If on oral medications, test blood glucose once a day in the morning before breakfas normal.   Mouth/Throat: No oropharyngeal exudate. Eyes: Conjunctivae and EOM are normal. Right eye exhibits no discharge. Left eye exhibits no discharge. No scleral icterus. Neck: Normal range of motion. Neck supple. No JVD present.  No tracheal deviati abdomen. There is mild to moderate gaseous distention of the colon with a mild to moderate amount of stool throughout the colon. Phlebolith noted overlying the right pelvis.       CONCLUSION:  Dilated air-filled loops of small bowel measuring up to 4.5 cm or high-grade acromioclavicular separation. There is mild degenerative bony proliferation at the acromioclavicular joint. No significant degenerative changes are seen at the glenohumeral joint. No bony erosions are identified.     IMPRESSION: Mild degenerat The following veins were imaged:  Common, deep, and superficial femoral, popliteal, sapheno-femoral junction, posterior tibial veins, and the contralateral common femoral vein.   PATIENT STATED HISTORY: (As transcribed by Technologist)  Patient is s/p right resolving. 7. Obesity (BMI 30-39.9)  -PT/OT    8. Gastroesophageal reflux disease without esophagitis  -continue with ppi    9. Type 2 diabetes mellitus without complication, with long-term current use of insulin (HCC)  -stable, improving.      10. Esse

## 2018-01-03 ENCOUNTER — NURSE ONLY (OUTPATIENT)
Dept: LAB | Age: 68
End: 2018-01-03
Attending: NURSE PRACTITIONER
Payer: MEDICARE

## 2018-01-03 DIAGNOSIS — R69 DIAGNOSIS UNKNOWN: Primary | ICD-10-CM

## 2018-01-03 LAB
ALBUMIN SERPL-MCNC: 2.9 G/DL (ref 3.5–4.8)
ALP LIVER SERPL-CCNC: 69 U/L (ref 45–117)
ALT SERPL-CCNC: 20 U/L (ref 17–63)
AST SERPL-CCNC: 37 U/L (ref 15–41)
BILIRUB SERPL-MCNC: 0.8 MG/DL (ref 0.1–2)
BUN BLD-MCNC: 10 MG/DL (ref 8–20)
CALCIUM BLD-MCNC: 9.4 MG/DL (ref 8.3–10.3)
CHLORIDE: 95 MMOL/L (ref 101–111)
CO2: 25 MMOL/L (ref 22–32)
CREAT BLD-MCNC: 0.86 MG/DL (ref 0.7–1.3)
ERYTHROCYTE [DISTWIDTH] IN BLOOD BY AUTOMATED COUNT: 12.8 % (ref 11.5–16)
GLUCOSE BLD-MCNC: 106 MG/DL (ref 70–99)
HCT VFR BLD AUTO: 32.3 % (ref 37–53)
HGB BLD-MCNC: 10.8 G/DL (ref 13–17)
M PROTEIN MFR SERPL ELPH: 7.2 G/DL (ref 6.1–8.3)
MCH RBC QN AUTO: 30.1 PG (ref 27–33.2)
MCHC RBC AUTO-ENTMCNC: 33.4 G/DL (ref 31–37)
MCV RBC AUTO: 90 FL (ref 80–99)
PLATELET # BLD AUTO: 693 10(3)UL (ref 150–450)
POTASSIUM SERPL-SCNC: 4.6 MMOL/L (ref 3.6–5.1)
RBC # BLD AUTO: 3.59 X10(6)UL (ref 3.8–5.8)
RED CELL DISTRIBUTION WIDTH-SD: 42.2 FL (ref 35.1–46.3)
SODIUM SERPL-SCNC: 130 MMOL/L (ref 136–144)
WBC # BLD AUTO: 6.2 X10(3) UL (ref 4–13)

## 2018-01-03 PROCEDURE — 85027 COMPLETE CBC AUTOMATED: CPT

## 2018-01-03 PROCEDURE — 80053 COMPREHEN METABOLIC PANEL: CPT

## 2018-01-04 ENCOUNTER — APPOINTMENT (OUTPATIENT)
Dept: LAB | Age: 68
End: 2018-01-04
Attending: NURSE PRACTITIONER
Payer: MEDICARE

## 2018-01-04 PROCEDURE — 87493 C DIFF AMPLIFIED PROBE: CPT

## 2018-01-05 ENCOUNTER — NURSE ONLY (OUTPATIENT)
Dept: LAB | Age: 68
End: 2018-01-05
Attending: NURSE PRACTITIONER
Payer: MEDICARE

## 2018-01-05 DIAGNOSIS — Z79.4 TYPE 2 DIABETES MELLITUS WITHOUT COMPLICATION, WITH LONG-TERM CURRENT USE OF INSULIN (HCC): ICD-10-CM

## 2018-01-05 DIAGNOSIS — E11.9 TYPE 2 DIABETES MELLITUS WITHOUT COMPLICATION, WITH LONG-TERM CURRENT USE OF INSULIN (HCC): ICD-10-CM

## 2018-01-05 DIAGNOSIS — R19.5 LOOSE STOOLS: Primary | ICD-10-CM

## 2018-01-08 RX ORDER — DICLOFENAC SODIUM 75 MG/1
75 TABLET, DELAYED RELEASE ORAL 2 TIMES DAILY
Qty: 60 TABLET | Refills: 0 | OUTPATIENT
Start: 2018-01-08

## 2018-01-08 NOTE — TELEPHONE ENCOUNTER
Should not take both Xarelto and Diclofenac (increased bleeding risk). I believe he was prescribed Tramadol by his surgeon.

## 2018-01-08 NOTE — TELEPHONE ENCOUNTER
Pt's daughter informed and verbalized understanding. Stated pt has c/o R knee pain and Tramadol not helping. Recommended to call surgeon to inform and possible change pain medication. Pt's daughter agreed and stated will call surgeon.

## 2018-01-10 ENCOUNTER — TELEPHONE (OUTPATIENT)
Dept: INTERNAL MEDICINE CLINIC | Facility: CLINIC | Age: 68
End: 2018-01-10

## 2018-01-10 NOTE — TELEPHONE ENCOUNTER
Cali with [facility] stated that she went out for eval today and would like to know if you would be willing to sign orders for RN, PT, OT services. Pt recently had R total knee replacement.       Also wanted to know if pt was up to date with influenza, pneu

## 2018-01-12 RX ORDER — TRAMADOL HYDROCHLORIDE 50 MG/1
TABLET ORAL
Refills: 0 | OUTPATIENT
Start: 2018-01-12

## 2018-01-12 NOTE — TELEPHONE ENCOUNTER
I believe this was prescribed by patient's orthopedic surgeon Dr. Do Saenz (no record in EMR of prescription from our office, listed as external medication). He should follow up with their office for refill.

## 2018-01-15 ENCOUNTER — TELEPHONE (OUTPATIENT)
Dept: INTERNAL MEDICINE CLINIC | Facility: CLINIC | Age: 68
End: 2018-01-15

## 2018-01-15 RX ORDER — DICLOFENAC SODIUM 75 MG/1
75 TABLET, DELAYED RELEASE ORAL 2 TIMES DAILY
Qty: 60 TABLET | Refills: 0 | Status: SHIPPED | OUTPATIENT
Start: 2018-01-15 | End: 2018-02-07

## 2018-01-15 NOTE — TELEPHONE ENCOUNTER
Slovak speaking only - is he still taking post-operative Xarelto (rivaroxaban)? If so he should not take NSAIDS such as the requested diclofenac as it can significantly increase bleeding risk.

## 2018-01-15 NOTE — TELEPHONE ENCOUNTER
Brenda Casas from [place] [address] requesting to speak with the nurse.  Stated that she needed OT evaluation from last week moved to this week   Please call [phone]

## 2018-01-16 ENCOUNTER — OFFICE VISIT (OUTPATIENT)
Dept: ENDOCRINOLOGY CLINIC | Facility: CLINIC | Age: 68
End: 2018-01-16

## 2018-01-16 VITALS
SYSTOLIC BLOOD PRESSURE: 124 MMHG | TEMPERATURE: 98 F | HEART RATE: 80 BPM | BODY MASS INDEX: 30.37 KG/M2 | WEIGHT: 189 LBS | RESPIRATION RATE: 18 BRPM | DIASTOLIC BLOOD PRESSURE: 64 MMHG | HEIGHT: 66 IN

## 2018-01-16 DIAGNOSIS — E11.9 TYPE 2 DIABETES MELLITUS WITHOUT COMPLICATION, WITH LONG-TERM CURRENT USE OF INSULIN (HCC): Primary | ICD-10-CM

## 2018-01-16 DIAGNOSIS — Z79.4 TYPE 2 DIABETES MELLITUS WITHOUT COMPLICATION, WITH LONG-TERM CURRENT USE OF INSULIN (HCC): Primary | ICD-10-CM

## 2018-01-16 LAB
CARTRIDGE LOT#: ABNORMAL NUMERIC
HEMOGLOBIN A1C: 7.3 % (ref 4.3–5.6)

## 2018-01-16 PROCEDURE — 83036 HEMOGLOBIN GLYCOSYLATED A1C: CPT | Performed by: NURSE PRACTITIONER

## 2018-01-16 PROCEDURE — 99214 OFFICE O/P EST MOD 30 MIN: CPT | Performed by: NURSE PRACTITIONER

## 2018-01-16 RX ORDER — GLIPIZIDE 5 MG/1
TABLET ORAL
Qty: 225 TABLET | Refills: 0 | Status: SHIPPED | OUTPATIENT
Start: 2018-01-16 | End: 2018-02-06

## 2018-01-16 NOTE — PATIENT INSTRUCTIONS
-pare de lisa Levemir en la Swetha Osiris y continue tomando 30 unidades en la noche  -empiese glipizide media tableta antes de desalluna y [facility] tableta entera de glipizide con el metformin antes de cenar  -chekiar conner azucar 3 veces al dexter manana, antes de lonche

## 2018-01-16 NOTE — PROGRESS NOTES
CC: Patient presents with:  Diabetes: follow up. pt did bring log sheet.       HISTORY:  Past Medical History:   Diagnosis Date   • Arthritis    • BPH (benign prostatic hypertrophy)    • Esophageal reflux    • Obesity    • Osteoarthritis    • STROKE     8 o denies   Failed Meds: novolog     Overall glucose control:at goal  today at 7.3% 12/19/17 8.9% prior 11/12/17  A1C at 11.9%  Using True Metrix meter   Average Fasting glucose  mg/dl   Average post meal glucose ,123,235 mg/dl   Hypoglycemia freq HYDROcodone-acetaminophen 5-325 MG Oral Tab, Take 1-2 tablets by mouth every 6 (six) hours as needed for Pain., Disp: 50 tablet, Rfl: 0  •  Ketoconazole 2 % External Shampoo, Wash scalp and face daily, Disp: 120 mL, Rfl: 11  •  hydrocortisone 2.5 % Externa Normal mood and affect. Diabetes foot exam: Bilateral barefoot skin diabetic exam is normal, visualized feet and the appearance is normal.  Bilateral monofilament/sensation of both feet is normal. Vibration to dorsum to the first toe perceived. Pulsation

## 2018-01-25 ENCOUNTER — TELEPHONE (OUTPATIENT)
Dept: INTERNAL MEDICINE CLINIC | Facility: CLINIC | Age: 68
End: 2018-01-25

## 2018-01-25 NOTE — TELEPHONE ENCOUNTER
Patient will be discharged from home health tomorrow 1-26-18 and will begin outpatient care next week - update from physical therapist call with any questions

## 2018-02-06 ENCOUNTER — OFFICE VISIT (OUTPATIENT)
Dept: ENDOCRINOLOGY CLINIC | Facility: CLINIC | Age: 68
End: 2018-02-06

## 2018-02-06 VITALS
SYSTOLIC BLOOD PRESSURE: 110 MMHG | TEMPERATURE: 98 F | RESPIRATION RATE: 18 BRPM | HEART RATE: 72 BPM | HEIGHT: 66 IN | BODY MASS INDEX: 31.02 KG/M2 | WEIGHT: 193 LBS | DIASTOLIC BLOOD PRESSURE: 60 MMHG

## 2018-02-06 DIAGNOSIS — E11.9 TYPE 2 DIABETES MELLITUS WITHOUT COMPLICATION, WITH LONG-TERM CURRENT USE OF INSULIN (HCC): ICD-10-CM

## 2018-02-06 DIAGNOSIS — Z79.4 TYPE 2 DIABETES MELLITUS WITHOUT COMPLICATION, WITH LONG-TERM CURRENT USE OF INSULIN (HCC): ICD-10-CM

## 2018-02-06 PROCEDURE — 99213 OFFICE O/P EST LOW 20 MIN: CPT | Performed by: NURSE PRACTITIONER

## 2018-02-06 RX ORDER — GLIPIZIDE 5 MG/1
TABLET ORAL
Qty: 225 TABLET | Refills: 0 | Status: SHIPPED | OUTPATIENT
Start: 2018-02-06 | End: 2018-05-16

## 2018-02-06 NOTE — PATIENT INSTRUCTIONS
Baje la insulina a 24 unidades y si tiene azucar baja menos de 80 bajele cada 4 unidades  si se llega a bajar a12 unidades mejor pare de lisa la insulina  Continue metformin 2 veces al dexter  y el glipizide media pastilla en la [place] y [place]

## 2018-02-06 NOTE — PROGRESS NOTES
CC: Patient presents with:  Diabetes: follow up. pt did bring readings. is going to [facility] for 2 months and  will be back april 15.        HISTORY:  Past Medical History:   Diagnosis Date   • Arthritis    • BPH (benign prostatic hypertrophy)    • Esophageal glucose control:at goal  today at 7.3% 12/19/17 8.9% prior 11/12/17  A1C at 11.9%  Using True Metrix meter   Average Fasting glucose  mg/dl   Average post meal glucose -220 mg/dl   Hypoglycemia frequency denies   Hyperlipidemia: none rotating t area on face and ears, Disp: 28 g, Rfl: 2  •  Prochlorperazine Maleate 5 MG Oral, Take 1 tablet (5 mg total) by mouth every 8 (eight) hours as needed for Nausea., Disp: 30 tablet, Rfl: 1  •  insulin detemir (LEVEMIR FLEXTOUCH) 100 UNIT/ML Subcutaneous Solu lipodystrophy. Psychiatric: Normal mood and affect.      Assessment and Plan:  Type 2 diabetes mellitus without complication, with long-term current use of insulin (hcc)  (primary encounter diagnosis): plan to lower nighttime levemir 24 units nightly and

## 2018-02-07 ENCOUNTER — OFFICE VISIT (OUTPATIENT)
Dept: INTERNAL MEDICINE CLINIC | Facility: CLINIC | Age: 68
End: 2018-02-07

## 2018-02-07 VITALS
DIASTOLIC BLOOD PRESSURE: 70 MMHG | TEMPERATURE: 98 F | HEIGHT: 66 IN | WEIGHT: 195 LBS | BODY MASS INDEX: 31.34 KG/M2 | SYSTOLIC BLOOD PRESSURE: 118 MMHG | RESPIRATION RATE: 13 BRPM | OXYGEN SATURATION: 99 % | HEART RATE: 76 BPM

## 2018-02-07 DIAGNOSIS — Z79.4 TYPE 2 DIABETES MELLITUS WITHOUT COMPLICATION, WITH LONG-TERM CURRENT USE OF INSULIN (HCC): ICD-10-CM

## 2018-02-07 DIAGNOSIS — Z96.651 STATUS POST RIGHT KNEE REPLACEMENT: ICD-10-CM

## 2018-02-07 DIAGNOSIS — I10 ESSENTIAL HYPERTENSION: ICD-10-CM

## 2018-02-07 DIAGNOSIS — M54.50 ACUTE BILATERAL LOW BACK PAIN WITHOUT SCIATICA: Primary | ICD-10-CM

## 2018-02-07 DIAGNOSIS — E11.9 TYPE 2 DIABETES MELLITUS WITHOUT COMPLICATION, WITH LONG-TERM CURRENT USE OF INSULIN (HCC): ICD-10-CM

## 2018-02-07 PROCEDURE — 99214 OFFICE O/P EST MOD 30 MIN: CPT | Performed by: INTERNAL MEDICINE

## 2018-02-07 NOTE — PROGRESS NOTES
Franci Rojas is a 78year old male. HPI:   Patient presents with:  Low Back Pain: x3 weeks  Patient presents with complaint of lower back pain for the past 3 weeks.   It was more severe initially for the first week or so - actually has essentially reso daily., Disp: 200 each, Rfl: 2  •  Omeprazole 40 MG Oral Capsule Delayed Release, Take 1 capsule (40 mg total) by mouth 2 (two) times daily. , Disp: 60 capsule, Rfl: 11  •  Lisinopril-Hydrochlorothiazide 20-12.5 MG Oral Tab, Take 1 tablet by mouth once skye bowel sounds throughout  PSYCH: pleasant, appropriate mood and affect  ASSESSMENT AND PLAN:   1. Acute bilateral lower back pain without sciatica  Essentially resolved. Asymptomatic today. Likely muscle spasms/strain.   No related radicular symptoms, con

## 2018-02-16 ENCOUNTER — MED REC SCAN ONLY (OUTPATIENT)
Dept: INTERNAL MEDICINE CLINIC | Facility: CLINIC | Age: 68
End: 2018-02-16

## 2018-05-01 ENCOUNTER — TELEPHONE (OUTPATIENT)
Dept: INTERNAL MEDICINE CLINIC | Facility: CLINIC | Age: 68
End: 2018-05-01

## 2018-05-01 DIAGNOSIS — D64.9 NORMOCYTIC ANEMIA: Primary | ICD-10-CM

## 2018-05-01 DIAGNOSIS — D68.59 THROMBOPHILIA (HCC): ICD-10-CM

## 2018-05-01 NOTE — TELEPHONE ENCOUNTER
Patient needs a referral for a hemotologist, saw Dr. Qasim Moeller and she told him to call his pcp to get referred

## 2018-05-07 ENCOUNTER — LAB ENCOUNTER (OUTPATIENT)
Dept: LAB | Age: 68
End: 2018-05-07
Attending: INTERNAL MEDICINE
Payer: MEDICARE

## 2018-05-07 DIAGNOSIS — R79.82 ELEVATED C-REACTIVE PROTEIN (CRP): ICD-10-CM

## 2018-05-07 DIAGNOSIS — R76.8 POSITIVE ANA (ANTINUCLEAR ANTIBODY): ICD-10-CM

## 2018-05-07 PROCEDURE — 86431 RHEUMATOID FACTOR QUANT: CPT

## 2018-05-07 PROCEDURE — 86160 COMPLEMENT ANTIGEN: CPT

## 2018-05-07 PROCEDURE — 85652 RBC SED RATE AUTOMATED: CPT

## 2018-05-07 PROCEDURE — 86235 NUCLEAR ANTIGEN ANTIBODY: CPT

## 2018-05-07 PROCEDURE — 86225 DNA ANTIBODY NATIVE: CPT

## 2018-05-07 PROCEDURE — 86140 C-REACTIVE PROTEIN: CPT

## 2018-05-07 PROCEDURE — 86038 ANTINUCLEAR ANTIBODIES: CPT

## 2018-05-07 PROCEDURE — 36415 COLL VENOUS BLD VENIPUNCTURE: CPT

## 2018-05-11 ENCOUNTER — HOSPITAL ENCOUNTER (OUTPATIENT)
Age: 68
Discharge: HOME | End: 2018-05-11
Attending: EMERGENCY MEDICINE
Payer: MEDICARE

## 2018-05-11 VITALS
OXYGEN SATURATION: 98 % | HEART RATE: 57 BPM | SYSTOLIC BLOOD PRESSURE: 155 MMHG | RESPIRATION RATE: 18 BRPM | DIASTOLIC BLOOD PRESSURE: 72 MMHG | TEMPERATURE: 98 F

## 2018-05-11 DIAGNOSIS — R53.83 FATIGUE, UNSPECIFIED TYPE: Primary | ICD-10-CM

## 2018-05-11 PROCEDURE — 99213 OFFICE O/P EST LOW 20 MIN: CPT

## 2018-05-11 PROCEDURE — 80047 BASIC METABLC PNL IONIZED CA: CPT

## 2018-05-11 PROCEDURE — 80053 COMPREHEN METABOLIC PANEL: CPT | Performed by: EMERGENCY MEDICINE

## 2018-05-11 PROCEDURE — 36415 COLL VENOUS BLD VENIPUNCTURE: CPT

## 2018-05-11 PROCEDURE — 85025 COMPLETE CBC W/AUTO DIFF WBC: CPT | Performed by: EMERGENCY MEDICINE

## 2018-05-11 NOTE — ED PROVIDER NOTES
Patient Seen in: [facility]    History   Patient presents with:  Fatigue (constitutional, neurologic)    Stated Complaint: pt thinks his sodium is low, x3days     HPI    41-year-old male, history of hypertension diabetes, here w schatzki's ring, hiatal hernia    Family history reviewed and is not pertinent to presenting problem.     Smoking status: Never Smoker                                                              Smokeless tobacco: Never Used                      Al (14)       ED Course as of May 11 1741  ------------------------------------------------------------      MDM       CBC and BMP are reviewed. His sodium is 130, definite below the threshold for normal but not dangerous.   I advised him to restrict himself

## 2018-05-11 NOTE — ED INITIAL ASSESSMENT (HPI)
C/O \"feeling like his sodium is low\". Sts that when he was in [facility] and he was feeling tired and had a HA was was jaundice, was told his sodium was low per patient. Was admitted to hospital in [facility] and \"got some medicine\".

## 2018-05-12 NOTE — ED NOTES
Patient and patients daughter informed that the patient should follow up with his PCP as planned. Sooner if any changes with verbalization of understanding.

## 2018-05-16 ENCOUNTER — OFFICE VISIT (OUTPATIENT)
Dept: INTERNAL MEDICINE CLINIC | Facility: CLINIC | Age: 68
End: 2018-05-16

## 2018-05-16 ENCOUNTER — APPOINTMENT (OUTPATIENT)
Dept: LAB | Age: 68
End: 2018-05-16
Attending: INTERNAL MEDICINE
Payer: MEDICARE

## 2018-05-16 VITALS
SYSTOLIC BLOOD PRESSURE: 124 MMHG | WEIGHT: 198.5 LBS | RESPIRATION RATE: 16 BRPM | HEART RATE: 72 BPM | DIASTOLIC BLOOD PRESSURE: 72 MMHG | BODY MASS INDEX: 31.9 KG/M2 | HEIGHT: 66 IN | TEMPERATURE: 98 F

## 2018-05-16 DIAGNOSIS — I10 ESSENTIAL HYPERTENSION: ICD-10-CM

## 2018-05-16 DIAGNOSIS — Z79.4 TYPE 2 DIABETES MELLITUS WITHOUT COMPLICATION, WITH LONG-TERM CURRENT USE OF INSULIN (HCC): ICD-10-CM

## 2018-05-16 DIAGNOSIS — M25.512 ACUTE PAIN OF BOTH SHOULDERS: ICD-10-CM

## 2018-05-16 DIAGNOSIS — E87.1 HYPONATREMIA: ICD-10-CM

## 2018-05-16 DIAGNOSIS — M25.511 ACUTE PAIN OF BOTH SHOULDERS: ICD-10-CM

## 2018-05-16 DIAGNOSIS — E11.9 TYPE 2 DIABETES MELLITUS WITHOUT COMPLICATION, WITH LONG-TERM CURRENT USE OF INSULIN (HCC): ICD-10-CM

## 2018-05-16 DIAGNOSIS — Z00.00 ENCOUNTER FOR MEDICARE ANNUAL WELLNESS EXAM: Primary | ICD-10-CM

## 2018-05-16 DIAGNOSIS — D50.9 MICROCYTIC ANEMIA: ICD-10-CM

## 2018-05-16 DIAGNOSIS — M1A.9XX0 CHRONIC GOUT WITHOUT TOPHUS, UNSPECIFIED CAUSE, UNSPECIFIED SITE: ICD-10-CM

## 2018-05-16 DIAGNOSIS — R11.0 NAUSEA: ICD-10-CM

## 2018-05-16 DIAGNOSIS — E66.9 OBESITY (BMI 30-39.9): ICD-10-CM

## 2018-05-16 DIAGNOSIS — D68.59 THROMBOPHILIA (HCC): ICD-10-CM

## 2018-05-16 DIAGNOSIS — M17.0 PRIMARY OSTEOARTHRITIS OF BOTH KNEES: ICD-10-CM

## 2018-05-16 DIAGNOSIS — K21.9 GASTROESOPHAGEAL REFLUX DISEASE WITHOUT ESOPHAGITIS: ICD-10-CM

## 2018-05-16 PROCEDURE — 83935 ASSAY OF URINE OSMOLALITY: CPT

## 2018-05-16 PROCEDURE — G0009 ADMIN PNEUMOCOCCAL VACCINE: HCPCS | Performed by: INTERNAL MEDICINE

## 2018-05-16 PROCEDURE — 90670 PCV13 VACCINE IM: CPT | Performed by: INTERNAL MEDICINE

## 2018-05-16 PROCEDURE — 84300 ASSAY OF URINE SODIUM: CPT

## 2018-05-16 PROCEDURE — 99214 OFFICE O/P EST MOD 30 MIN: CPT | Performed by: INTERNAL MEDICINE

## 2018-05-16 PROCEDURE — G0439 PPPS, SUBSEQ VISIT: HCPCS | Performed by: INTERNAL MEDICINE

## 2018-05-16 RX ORDER — CALCIUM CITRATE/VITAMIN D3 200MG-6.25
TABLET ORAL
Qty: 100 STRIP | Refills: 3 | Status: ON HOLD | OUTPATIENT
Start: 2018-05-16 | End: 2018-07-27

## 2018-05-16 RX ORDER — OMEPRAZOLE 40 MG/1
40 CAPSULE, DELAYED RELEASE ORAL 2 TIMES DAILY
Qty: 60 CAPSULE | Refills: 11 | Status: SHIPPED | OUTPATIENT
Start: 2018-05-16 | End: 2018-07-16

## 2018-05-16 RX ORDER — BISOPROLOL FUMARATE 10 MG/1
10 TABLET ORAL DAILY
Qty: 90 TABLET | Refills: 1 | Status: SHIPPED | OUTPATIENT
Start: 2018-05-16 | End: 2018-07-16

## 2018-05-16 RX ORDER — TRAMADOL HYDROCHLORIDE 50 MG/1
50 TABLET ORAL EVERY 8 HOURS PRN
Qty: 30 TABLET | Refills: 0 | Status: SHIPPED | OUTPATIENT
Start: 2018-05-16 | End: 2018-05-31

## 2018-05-16 NOTE — PATIENT INSTRUCTIONS
- Follow up with Dr. Angela James for the shoulder  - Take pain medication only as needed. - Follow up with Dr. Derrick Elam for sodium problems.    - Follow up with Dr. Muna Guerin for eye exam.  - Follow up with me in 3 months.     It was a pleasure seeing you in the

## 2018-05-16 NOTE — TELEPHONE ENCOUNTER
Patient's daughter called to request a refill for True metrix test strips 0.5.  Patient was seen today

## 2018-05-16 NOTE — PROGRESS NOTES
HPI:   Michael Noel is a 78year old male who presents for a Medicare Subsequent Annual Wellness visit (Pt already had Initial Annual Wellness).   His last annual assessment has been over 1 year: Annual Physical due on 06/09/1952    [facility] health - Daily Activities based on screening of functional status.    Problems with daily activities? : Yes          Depression Screening (PHQ-2/PHQ-9): Over the LAST 2 WEEKS   Little interest or pleasure in doing things (over the last two weeks)?: Not at all  [facility] TRIG 108 11/17/2017          Last Chemistry Labs:     Lab Results  Component Value Date   AST 34 05/11/2018   ALT 30 05/11/2018   CA 9.0 05/11/2018   ALB 3.5 05/11/2018   TSH 1.820 05/17/2018   CREATSERUM 0.90 05/11/2018    (H) 05/11/2018        C or use drugs.      REVIEW OF SYSTEMS:   GENERAL: feels well otherwise  SKIN: denies any unusual skin lesions  EYES: denies blurred vision or double vision  HEENT: denies nasal congestion, sinus pain or ST  LUNGS: denies shortness of breath with exertion  CA Reglan Iv(metoclopramide Hcl) 10mg Ij 12/21/2015   Deferred Date(s) Deferred   • Fluad High dose 72 yr and older (70868) 11/03/2017        ASSESSMENT AND OTHER RELEVANT CHRONIC CONDITIONS:   Franci Rojas is a 78year old male who presents for a Medicare A Primary osteoarthritis of both knees   Had right knee replacement surgery a few months ago. Doing better. 7.  Chronic gout without tophus, unspecified cause, unspecified site   No recent gout flare-ups.     8.  Obesity (BMI 30-39)   Up 2 lbs from last v flowsheet data found.     Fasting Blood Sugar (FSB)Annually   Glucose (mg/dL)   Date Value   05/11/2018 143 (H)   01/15/2016 228 (H)   12/21/2015 105 (H)   ----------  GLUCOSE (mg/dL)   Date Value   12/30/2013 210 (H)   ----------    Cardiovascular Disease vaccine history found This may be covered with your prescription benefits, but Medicare does not cover unless Medically needed    Zoster   Not covered by Medicare Part B No vaccine history found This may be covered with your pharmacy  prescription benefits

## 2018-05-17 ENCOUNTER — OFFICE VISIT (OUTPATIENT)
Dept: HEMATOLOGY/ONCOLOGY | Facility: HOSPITAL | Age: 68
End: 2018-05-17
Attending: INTERNAL MEDICINE
Payer: MEDICARE

## 2018-05-17 VITALS
HEIGHT: 65.98 IN | HEART RATE: 54 BPM | SYSTOLIC BLOOD PRESSURE: 154 MMHG | DIASTOLIC BLOOD PRESSURE: 72 MMHG | WEIGHT: 200.38 LBS | OXYGEN SATURATION: 95 % | TEMPERATURE: 97 F | RESPIRATION RATE: 18 BRPM | BODY MASS INDEX: 32.2 KG/M2

## 2018-05-17 DIAGNOSIS — D64.9 NORMOCYTIC ANEMIA: ICD-10-CM

## 2018-05-17 DIAGNOSIS — D68.59 THROMBOPHILIA (HCC): ICD-10-CM

## 2018-05-17 PROCEDURE — 99205 OFFICE O/P NEW HI 60 MIN: CPT | Performed by: INTERNAL MEDICINE

## 2018-05-17 NOTE — CONSULTS
[facility] Report of Consultation    Patient Name: Jimmy Navarrete   YOB: 1950   Medical Record Number: [id number]   CSN: [id number]   Consulting Physician: Singh Guillen MD  Referring Physician(s): Dr. Morteza Brown  Date of Consultation: complication, not stated as uncontrolled around 2004   • Ulcer, esophagus 1/16   • Unspecified essential hypertension        Past Surgical History:  Past Surgical History:  2/2/2016: Blaise Keating N/A      Comment: Procedure: López Christie 50 MG Oral Tab, Take 1 tablet (50 mg total) by mouth every 8 (eight) hours as needed for Pain., Disp: 30 tablet, Rfl: 0  •  Glucose Blood (TRUE METRIX BLOOD GLUCOSE TEST) In Vitro Strip, Test three times daily, Disp: 100 strip, Rfl: 3  •  Insulin Pen Needl 1404    Hemoglobin 13.2 06/07/2015 1411    HCT 32.3 (L) 01/03/2018 0900    HCT 27.6 (L) 12/27/2017 0800    HCT 29.1 (L) 12/24/2017 0516    HCT 27.6 (L) 12/31/2013 0600    HCT 27.6 (L) 12/30/2013 0611    HCT 35.6 (L) 12/29/2013 1650    Hematocrit 39.5 12/21 WBC 6.7 12/27/2017 0800    WBC 9.7 12/24/2017 0516    WBC 6.11 12/21/2015 1203    WBC 6.94 12/14/2015 1404    WBC 12.37 (H) 06/07/2015 1411    WBC 8.4 12/31/2013 0600    WBC 7.3 12/30/2013 0611    WBC 10.3 12/29/2013 1650    Platelet Count 005 39/17/048 Calcium, Total 9.4 01/03/2018 0900    Calcium, Total 8.6 12/27/2017 0800    Albumin 3.5 05/11/2018 1713    Albumin 2.9 (L) 01/03/2018 0900    Albumin 2.4 (L) 12/27/2017 0800    Albumin 4.3 01/15/2016 1549    Albumin 3.6 12/21/2015 1203    Albumin 3.5 12/14 (SGOT) 13 03/11/2014 1024    AST 34 05/11/2018 1713    AST 37 01/03/2018 0900    AST 19 12/27/2017 0800    AST 38 (H) 12/21/2015 1203    AST 33 12/14/2015 1404    ALT (SGPT) 20 01/15/2016 1549    ALT (SGPT) 30 09/26/2015 0837    ALT (SGPT) 14 03/11/2014 10 previous CT are  unchanged. No mass, obstruction, or calcification. ADRENALS:  No mass or enlargement. AORTA/VASCULAR:  No aneurysm. RETROPERITONEUM:  No mass or adenopathy. BOWEL/MESENTERY:  Small sliding-type hiatal hernia.  No visible mass, o

## 2018-05-17 NOTE — PATIENT INSTRUCTIONS
Dr. Ki Moran nurse line Monday through Friday 84:30:  [phone]  After hours or weekends for emergent needs:  [phone].      To schedule testing, Central [phone]  For Medical Records: [phone]

## 2018-05-18 ENCOUNTER — NURSE ONLY (OUTPATIENT)
Dept: ENDOCRINOLOGY CLINIC | Facility: CLINIC | Age: 68
End: 2018-05-18

## 2018-05-18 VITALS
WEIGHT: 201 LBS | DIASTOLIC BLOOD PRESSURE: 70 MMHG | BODY MASS INDEX: 32 KG/M2 | SYSTOLIC BLOOD PRESSURE: 148 MMHG | HEART RATE: 60 BPM

## 2018-05-18 DIAGNOSIS — E11.9 TYPE 2 DIABETES MELLITUS WITHOUT COMPLICATION, WITH LONG-TERM CURRENT USE OF INSULIN (HCC): Primary | ICD-10-CM

## 2018-05-18 DIAGNOSIS — Z79.4 TYPE 2 DIABETES MELLITUS WITHOUT COMPLICATION, WITH LONG-TERM CURRENT USE OF INSULIN (HCC): Primary | ICD-10-CM

## 2018-05-18 PROCEDURE — 83036 HEMOGLOBIN GLYCOSYLATED A1C: CPT

## 2018-05-18 PROCEDURE — G0108 DIAB MANAGE TRN  PER INDIV: HCPCS

## 2018-05-18 NOTE — PROGRESS NOTES
Zenon Benjamincaron  : 1950 was seen for Diabetic Education Follow up:    Date: 2018  Start time:12:30   End time: 1pm    Assessment:     Assessment: /70   Pulse 60   Wt 201 lb   BMI 32.46 kg/m²        HEMOGLOBIN A1C (%)   Date Value   20 effect of activity on BG discussed. Reviewed when to call MD and benefits of goal setting.       Monitoring  Reinforced glucose monitoring schedules: he is to start testing at least 2 times a day and start logs return in 2 weeks with logs  Stressed need to

## 2018-05-31 ENCOUNTER — OFFICE VISIT (OUTPATIENT)
Dept: HEMATOLOGY/ONCOLOGY | Facility: HOSPITAL | Age: 68
End: 2018-05-31
Attending: INTERNAL MEDICINE
Payer: MEDICARE

## 2018-05-31 VITALS
TEMPERATURE: 95 F | BODY MASS INDEX: 31.63 KG/M2 | RESPIRATION RATE: 18 BRPM | HEIGHT: 65.98 IN | WEIGHT: 196.81 LBS | SYSTOLIC BLOOD PRESSURE: 150 MMHG | HEART RATE: 68 BPM | DIASTOLIC BLOOD PRESSURE: 68 MMHG | OXYGEN SATURATION: 95 %

## 2018-05-31 DIAGNOSIS — K44.9 HIATAL HERNIA: ICD-10-CM

## 2018-05-31 DIAGNOSIS — D64.9 NORMOCYTIC ANEMIA: ICD-10-CM

## 2018-05-31 DIAGNOSIS — E87.1 HYPONATREMIA: Primary | ICD-10-CM

## 2018-05-31 DIAGNOSIS — D50.0 IRON DEFICIENCY ANEMIA DUE TO CHRONIC BLOOD LOSS: ICD-10-CM

## 2018-05-31 DIAGNOSIS — D75.839 THROMBOCYTOSIS: ICD-10-CM

## 2018-05-31 PROCEDURE — 99215 OFFICE O/P EST HI 40 MIN: CPT | Performed by: INTERNAL MEDICINE

## 2018-05-31 NOTE — PROGRESS NOTES
[facility] Progress Note    Patient Name: Tejinder Louise   YOB: 1950   Medical Record Number: [id number]   CSN: [id number]   Attending Physician: Barrington Sharif M.D.    Referring Physician: Kuldip Barrientos MD      Date of Visit: 5/31/2018 POSSIBLE BIOPSY, POSSIBLE                POLYPECTOMY 45776, 37927;  Surgeon: Earlis Nissen, MD;  Location: [address] [place] [address]  No date: NONCONTACT LASER SURGERY PROSTATE  No date: OTHER      Comment: prostate  No ghassan and rhythm. Abdomen: Soft, non tender with good bowel sounds. No hepatosplenomegaly. No palpable mass. Extremities: Pedal pulses are present. Some mild ankle and right knee edema. Neurological: Grossly intact.        Laboratory:    Lab Results  Compon Unknown No apparent monoc. .. MMA SERUM/PLASMA, VITAMIN B12 Latest Ref Range: 0.00 - 0.40 umol/L 0.19   SOLUBLE TRANSFERRIN RECEPTOR Latest Ref Range: 2.2 - 5.0 mg/L 2.8   TSH Latest Ref Range: 0.350 - 5.500 mIU/mL 1.820   WBC Latest Ref Range: 4.0 - 13. 0 care. They were comfortable with the plan. All questions were answered to their satisfaction. Emotional Well Being:  I have assessed the patient's emotional well-being and any concerns about anxiety or depression.   We discussed issues of distress, co

## 2018-06-05 ENCOUNTER — NURSE ONLY (OUTPATIENT)
Dept: ENDOCRINOLOGY CLINIC | Facility: CLINIC | Age: 68
End: 2018-06-05

## 2018-06-05 VITALS
WEIGHT: 190 LBS | BODY MASS INDEX: 30.53 KG/M2 | HEART RATE: 55 BPM | SYSTOLIC BLOOD PRESSURE: 148 MMHG | DIASTOLIC BLOOD PRESSURE: 80 MMHG | HEIGHT: 66 IN

## 2018-06-05 DIAGNOSIS — E11.9 TYPE 2 DIABETES MELLITUS WITHOUT COMPLICATION, WITH LONG-TERM CURRENT USE OF INSULIN (HCC): Primary | ICD-10-CM

## 2018-06-05 DIAGNOSIS — Z79.4 TYPE 2 DIABETES MELLITUS WITHOUT COMPLICATION, WITH LONG-TERM CURRENT USE OF INSULIN (HCC): Primary | ICD-10-CM

## 2018-06-05 PROCEDURE — G0108 DIAB MANAGE TRN  PER INDIV: HCPCS

## 2018-06-05 NOTE — PROGRESS NOTES
[facility]  : 1950 was seen for Diabetic Education Follow up:    Date: 2018  Start time:11:30am End time: 12: pm    Assessment:     Assessment: /80   Pulse 55   Ht 66\"   Wt 190 lb   BMI 30.67 kg/m²        HEMOGLOBIN A1C (%)   Date Value Active  Benefits and effect of activity on BG discussed. Reviewed when to call MD and benefits of goal setting.     Monitoring  Reinforced glucose monitoring schedules: continue to test as he has been  BG today: 102 mg/dL fasting  Stressed need to test BG

## 2018-06-07 ENCOUNTER — OFFICE VISIT (OUTPATIENT)
Dept: NEPHROLOGY | Facility: CLINIC | Age: 68
End: 2018-06-07

## 2018-06-07 VITALS — SYSTOLIC BLOOD PRESSURE: 138 MMHG | BODY MASS INDEX: 32 KG/M2 | DIASTOLIC BLOOD PRESSURE: 76 MMHG | WEIGHT: 198 LBS

## 2018-06-07 DIAGNOSIS — R63.4 WEIGHT LOSS: ICD-10-CM

## 2018-06-07 DIAGNOSIS — E22.2 SIADH (SYNDROME OF INAPPROPRIATE ADH PRODUCTION) (HCC): ICD-10-CM

## 2018-06-07 DIAGNOSIS — E87.1 HYPONATREMIA: Primary | ICD-10-CM

## 2018-06-07 PROCEDURE — 99204 OFFICE O/P NEW MOD 45 MIN: CPT | Performed by: INTERNAL MEDICINE

## 2018-06-07 RX ORDER — TRAMADOL HYDROCHLORIDE 50 MG/1
50 TABLET ORAL EVERY 8 HOURS PRN
COMMUNITY
End: 2018-06-12

## 2018-06-07 RX ORDER — GLIPIZIDE 5 MG/1
TABLET ORAL
COMMUNITY
End: 2018-06-27

## 2018-06-07 RX ORDER — MELATONIN
325
COMMUNITY
End: 2019-08-21

## 2018-06-07 NOTE — PROGRESS NOTES
Nephrology Consult Note    REASON FOR CONSULT: Hyponatremia    ASSESSMENT/PLAN:        1) Hyponatremia- serum sodium has trended down since 2016 and is currently 129 mEq/L; has been evaluated for several etiologies including thyroid dysfunction, adrenal in colonoscopy and was placed on a proton pump inhibitor. Denies any history of acute or chronic renal failure gross microscopic hematuria proteinuria kidney stones or infections. No history of cardiac pulmonary or hepatic disease. Does not smoke or drink. with breakfast. Disp:  Rfl:    TraMADol HCl 50 MG Oral Tab Take 50 mg by mouth every 8 (eight) hours as needed for Pain. Disp:  Rfl:    SITagliptin-MetFORMIN HCl (JANUMET)  MG Oral Tab Take 1 tablet by mouth 2 (two) times daily with meals.  Disp:  Rf Abdomen: Soft, non-tender. + bowel sounds, no palpable organomegaly  Extremities: Without clubbing, cyanosis or edema.   Neurologic:  normal affect, cranial nerves grossly intact, moving all extremities  Skin: Warm and dry, no rashes        Lili Faith,

## 2018-06-11 ENCOUNTER — HOSPITAL ENCOUNTER (OUTPATIENT)
Dept: CT IMAGING | Age: 68
Discharge: HOME | End: 2018-06-11
Attending: INTERNAL MEDICINE
Payer: MEDICARE

## 2018-06-11 DIAGNOSIS — E87.1 HYPONATREMIA: ICD-10-CM

## 2018-06-11 DIAGNOSIS — E22.2 SIADH (SYNDROME OF INAPPROPRIATE ADH PRODUCTION) (HCC): ICD-10-CM

## 2018-06-11 DIAGNOSIS — R63.4 WEIGHT LOSS: ICD-10-CM

## 2018-06-11 PROCEDURE — 70491 CT SOFT TISSUE NECK W/DYE: CPT | Performed by: INTERNAL MEDICINE

## 2018-06-11 PROCEDURE — 82565 ASSAY OF CREATININE: CPT

## 2018-06-11 PROCEDURE — 71260 CT THORAX DX C+: CPT | Performed by: INTERNAL MEDICINE

## 2018-06-12 ENCOUNTER — TELEPHONE (OUTPATIENT)
Dept: ENDOCRINOLOGY CLINIC | Facility: CLINIC | Age: 68
End: 2018-06-12

## 2018-06-12 DIAGNOSIS — Z79.4 TYPE 2 DIABETES MELLITUS WITHOUT COMPLICATION, WITH LONG-TERM CURRENT USE OF INSULIN (HCC): Primary | ICD-10-CM

## 2018-06-12 DIAGNOSIS — E11.9 TYPE 2 DIABETES MELLITUS WITHOUT COMPLICATION, WITH LONG-TERM CURRENT USE OF INSULIN (HCC): Primary | ICD-10-CM

## 2018-06-12 PROCEDURE — 86038 ANTINUCLEAR ANTIBODIES: CPT | Performed by: INTERNAL MEDICINE

## 2018-06-12 PROCEDURE — 36415 COLL VENOUS BLD VENIPUNCTURE: CPT | Performed by: INTERNAL MEDICINE

## 2018-06-13 ENCOUNTER — TELEPHONE (OUTPATIENT)
Dept: NEPHROLOGY | Facility: CLINIC | Age: 68
End: 2018-06-13

## 2018-06-13 ENCOUNTER — HOSPITAL ENCOUNTER (OUTPATIENT)
Dept: PHYSICAL THERAPY | Facility: HOSPITAL | Age: 68
Setting detail: THERAPIES SERIES
Discharge: HOME | End: 2018-06-13
Attending: INTERNAL MEDICINE
Payer: MEDICARE

## 2018-06-13 DIAGNOSIS — M19.019 OSTEOARTHRITIS OF AC (ACROMIOCLAVICULAR) JOINT: ICD-10-CM

## 2018-06-13 DIAGNOSIS — M75.20 BICEPS TENDINITIS, UNSPECIFIED LATERALITY: ICD-10-CM

## 2018-06-13 DIAGNOSIS — M75.40 IMPINGEMENT SYNDROME OF SHOULDER REGION, UNSPECIFIED LATERALITY: ICD-10-CM

## 2018-06-13 PROCEDURE — 97110 THERAPEUTIC EXERCISES: CPT

## 2018-06-13 PROCEDURE — 97163 PT EVAL HIGH COMPLEX 45 MIN: CPT

## 2018-06-13 NOTE — PROGRESS NOTES
UPPER EXTREMITY EVALUATION:   Referring Physician: Dr. Aparna Palomino  Diagnosis: Biceps tendinitis, unspecified laterality (M75.20)  Impingement syndrome of shoulder region, unspecified laterality (M75.40)  Osteoarthritis of AC (acromioclavicular) joint (M19.019) Stroke [facility])    • Type II or unspecified type diabetes mellitus without mention of complication, not stated as uncontrolled around 2004   • Ulcer, esophagus 1/16   • Unspecified essential hypertension        ASSESSMENT  Evaluation consistent with chronic b positive, L positive  Biceps Load Test: R negative, L negative     Today’s Treatment and Response: Patient education provided on evaluation findings, plan of care, importance of PT, and proper performance of HEP.   Patient was instructed in and issued a HEP Potential:good    FOTO: 36/100    Current G Code: Carrying, Moving and Handling Objects CL: 60-79% impaired, limited, or restricted  Projected G Code: Carrying, Moving and Handling Objects CJ: 20-39% impaired, limited, or restricted    Patient/Family/Careg

## 2018-06-14 ENCOUNTER — TELEPHONE (OUTPATIENT)
Dept: NEPHROLOGY | Facility: CLINIC | Age: 68
End: 2018-06-14

## 2018-06-15 ENCOUNTER — HOSPITAL ENCOUNTER (OUTPATIENT)
Dept: PHYSICAL THERAPY | Facility: HOSPITAL | Age: 68
Setting detail: THERAPIES SERIES
Discharge: HOME | End: 2018-06-15
Attending: FAMILY MEDICINE
Payer: MEDICARE

## 2018-06-15 PROCEDURE — 97110 THERAPEUTIC EXERCISES: CPT

## 2018-06-15 PROCEDURE — 97140 MANUAL THERAPY 1/> REGIONS: CPT

## 2018-06-15 NOTE — ADDENDUM NOTE
Encounter addended by: Domingo Noel PT on: 6/15/2018 11:08 AM<BR>    Actions taken: Letter status changed

## 2018-06-15 NOTE — PROGRESS NOTES
Dx: Biceps tendinitis, unspecified laterality (M75.20)  Impingement syndrome of shoulder region, unspecified laterality (M75.40)  Osteoarthritis of AC (acromioclavicular) joint (K25.069)          Authorized # of Visits:  Medicare         Next MD visit: non independent and compliant with comprehensive HEP to maintain progress achieved in PT (10 visits)    Plan: Continue current PT plan of care 2x/week for improved L shoulder AROM, strength, scapular mobility, and pain relief.     Date: 6/15/2018 TX#: 2/10 Date

## 2018-06-18 ENCOUNTER — HOSPITAL ENCOUNTER (OUTPATIENT)
Dept: PHYSICAL THERAPY | Facility: HOSPITAL | Age: 68
Setting detail: THERAPIES SERIES
Discharge: HOME | End: 2018-06-18
Attending: INTERNAL MEDICINE
Payer: MEDICARE

## 2018-06-18 PROCEDURE — 97110 THERAPEUTIC EXERCISES: CPT

## 2018-06-18 PROCEDURE — 97140 MANUAL THERAPY 1/> REGIONS: CPT

## 2018-06-18 NOTE — PROGRESS NOTES
Dx: Biceps tendinitis, unspecified laterality (M75.20)  Impingement syndrome of shoulder region, unspecified laterality (M75.40)  Osteoarthritis of AC (acromioclavicular) joint (Y45.653)          Authorized # of Visits:  Medicare         Next MD visit: non relief.     Date: 6/15/2018 TX#: 2/10 Date: 6/18/2018   TX#: 3/8   Date:               TX#: 4/ Date:               TX#: 5/ Date:               TX#: 6/ Date:               TX#: 7/ Date:               TX#: 8/   PROM L shoulder flexion/abduction/ER/IR PROM L s

## 2018-06-20 ENCOUNTER — HOSPITAL ENCOUNTER (OUTPATIENT)
Dept: PHYSICAL THERAPY | Facility: HOSPITAL | Age: 68
Setting detail: THERAPIES SERIES
Discharge: HOME | End: 2018-06-20
Attending: INTERNAL MEDICINE
Payer: MEDICARE

## 2018-06-20 PROCEDURE — 97140 MANUAL THERAPY 1/> REGIONS: CPT

## 2018-06-20 PROCEDURE — 97110 THERAPEUTIC EXERCISES: CPT

## 2018-06-20 NOTE — PROGRESS NOTES
Dx: Biceps tendinitis, unspecified laterality (M75.20)  Impingement syndrome of shoulder region, unspecified laterality (M75.40)  Osteoarthritis of AC (acromioclavicular) joint (G93.170)          Authorized # of Visits:  Medicare         Next MD visit: non Continue current PT plan of care 2x/week for improved L shoulder AROM, strength, scapular mobility, and pain relief.     Date: 6/15/2018 TX#: 2/10 Date: 6/18/2018   TX#: 3/8   Date: 6/20/2018   TX#: 4/8 Date:               TX#: 5/ Date:               TX#: 10

## 2018-06-22 ENCOUNTER — TELEPHONE (OUTPATIENT)
Dept: NEPHROLOGY | Facility: CLINIC | Age: 68
End: 2018-06-22

## 2018-06-22 NOTE — TELEPHONE ENCOUNTER
D/W daughter- CT shows soft tissue fullness of throat + severe sinusitis- asked pt to see ENT; no evidence of malignancy (old chronic enlarged LN)- edgardo ly

## 2018-06-25 ENCOUNTER — HOSPITAL ENCOUNTER (OUTPATIENT)
Dept: PHYSICAL THERAPY | Facility: HOSPITAL | Age: 68
Setting detail: THERAPIES SERIES
Discharge: HOME | End: 2018-06-25
Attending: INTERNAL MEDICINE
Payer: MEDICARE

## 2018-06-25 DIAGNOSIS — K21.9 GASTROESOPHAGEAL REFLUX DISEASE WITHOUT ESOPHAGITIS: ICD-10-CM

## 2018-06-25 PROCEDURE — 97110 THERAPEUTIC EXERCISES: CPT

## 2018-06-25 RX ORDER — OMEPRAZOLE 40 MG/1
40 CAPSULE, DELAYED RELEASE ORAL 2 TIMES DAILY
Qty: 60 CAPSULE | Refills: 11 | Status: CANCELLED | OUTPATIENT
Start: 2018-06-25 | End: 2019-06-20

## 2018-06-25 RX ORDER — BISOPROLOL FUMARATE 10 MG/1
10 TABLET ORAL DAILY
Qty: 90 TABLET | Refills: 1 | Status: CANCELLED | OUTPATIENT
Start: 2018-06-25

## 2018-06-25 NOTE — PROGRESS NOTES
Dx: Biceps tendinitis, unspecified laterality (M75.20)  Impingement syndrome of shoulder region, unspecified laterality (M75.40)  Osteoarthritis of AC (acromioclavicular) joint (K90.396)          Authorized # of Visits:  Medicare         Next MD visit: non TX#: 3/8   Date: 6/20/2018   TX#: 4/8 Date: 6/25/2018   TX#: 5/10 Date:               TX#: 6/ Date:               TX#: 7/ Date:               TX#: 8/   PROM L shoulder flexion/abduction/ER/IR PROM L shoulder flexion/abduction/ER/IR PROM L shoulder flexion

## 2018-06-25 NOTE — TELEPHONE ENCOUNTER
Patient's daughter requesting a refill for Bisoprolol Fumarate 10 MG,  Omeprazole 40 MG and SITagliptin-MetFORMIN HCl (JANUMET) . Ana Luisa Jernigan, daughter, states patient is out of medication.  Please send to [facility] [id number], [phone] [facility] [phone]

## 2018-06-25 NOTE — TELEPHONE ENCOUNTER
Spoke to pts dtr and informed there should be refills on file for all meds aside from [place]. Please advise on Janumet refill?     Refill requested: Bisoprolol 10 mg #90 1R + Janumet  #180 NR + Omeprazole 40 mg     Passed protocol    Last refill: will be customized to assessed educational needs. Basal rates, insulin to carbohydrate and correction factor ratios will be calculated and, if already in use, will  be reviewed for accuracy and recalculated if necessary.   INDIVIDUAL FOLLOW-UP APPOINTMENTS [facility],[address] [facility]  [address] [facility] [address] [place] [id number] [place] [address]

## 2018-06-26 ENCOUNTER — TELEPHONE (OUTPATIENT)
Dept: INTERNAL MEDICINE CLINIC | Facility: CLINIC | Age: 68
End: 2018-06-26

## 2018-06-26 NOTE — TELEPHONE ENCOUNTER
His diabetes medication is too expensive (Janumet) $1,000 and so they are wondering if he can get something else prescribed.

## 2018-06-27 ENCOUNTER — HOSPITAL ENCOUNTER (OUTPATIENT)
Dept: PHYSICAL THERAPY | Facility: HOSPITAL | Age: 68
Setting detail: THERAPIES SERIES
Discharge: HOME | End: 2018-06-27
Attending: INTERNAL MEDICINE
Payer: MEDICARE

## 2018-06-27 PROCEDURE — 97110 THERAPEUTIC EXERCISES: CPT

## 2018-06-27 PROCEDURE — 97140 MANUAL THERAPY 1/> REGIONS: CPT

## 2018-06-27 RX ORDER — GLIPIZIDE 5 MG/1
5 TABLET ORAL
Qty: 60 TABLET | Refills: 3 | Status: SHIPPED | OUTPATIENT
Start: 2018-06-27 | End: 2018-11-12

## 2018-06-27 NOTE — TELEPHONE ENCOUNTER
Daughter Dipti Cali given all instructions and verbalizes understanding. Daughter states she will inform pt.

## 2018-06-27 NOTE — PROGRESS NOTES
Dx: Biceps tendinitis, unspecified laterality (M75.20)  Impingement syndrome of shoulder region, unspecified laterality (M75.40)  Osteoarthritis of AC (acromioclavicular) joint (P01.461)          Authorized # of Visits:  Medicare         Next MD visit: non Date: 6/18/2018   TX#: 3/8   Date: 6/20/2018   TX#: 4/8 Date: 6/25/2018   TX#: 5/10 Date: 6/27/2018   TX#: 6/10 Date:               TX#: 7/ Date:               TX#: 8/   PROM L shoulder flexion/abduction/ER/IR PROM L shoulder flexion/abduction/ER/IR PROM L

## 2018-06-28 ENCOUNTER — OFFICE VISIT (OUTPATIENT)
Dept: HEMATOLOGY/ONCOLOGY | Facility: HOSPITAL | Age: 68
End: 2018-06-28
Attending: INTERNAL MEDICINE
Payer: MEDICARE

## 2018-06-28 VITALS
BODY MASS INDEX: 32.49 KG/M2 | WEIGHT: 202.19 LBS | OXYGEN SATURATION: 96 % | DIASTOLIC BLOOD PRESSURE: 79 MMHG | TEMPERATURE: 98 F | HEIGHT: 65.98 IN | RESPIRATION RATE: 18 BRPM | SYSTOLIC BLOOD PRESSURE: 153 MMHG | HEART RATE: 64 BPM

## 2018-06-28 DIAGNOSIS — D50.0 IRON DEFICIENCY ANEMIA DUE TO CHRONIC BLOOD LOSS: ICD-10-CM

## 2018-06-28 DIAGNOSIS — D64.9 NORMOCYTIC ANEMIA: ICD-10-CM

## 2018-06-28 DIAGNOSIS — D75.839 THROMBOCYTOSIS: Primary | ICD-10-CM

## 2018-06-28 PROCEDURE — 99214 OFFICE O/P EST MOD 30 MIN: CPT | Performed by: INTERNAL MEDICINE

## 2018-06-28 NOTE — PROGRESS NOTES
[facility] Progress Note    Patient Name: Jesús Rockwell   YOB: 1950   Medical Record Number: [id number]   CSN: [id number]   Attending Physician: Barbie Ríos M.D.    Referring Physician: Bravo Liang MD      Date of Visit: 6/28/2018 History:  Past Surgical History:  2/2/2016: COLONOSCOPY,DIAGNOSTIC N/A      Comment: Procedure: ESOPHAGOGASTRODUODENOSCOPY,                COLONOSCOPY, POSSIBLE BIOPSY, POSSIBLE                POLYPECTOMY 13043, 10145;  Surgeon: Nicky Jaimes No palpable lymphadenopathy. Neck is supple. Lymphatics: There is no palpable lymphadenopathy   Chest: Clear to auscultation. Heart: Regular rate and rhythm. Abdomen: Soft, non tender with good bowel sounds. No hepatosplenomegaly. No palpable mass. Range: 22.0 - 322.0 ng/mL 10.4 (L)   Vitamin B12 Latest Ref Range: 193 - 986 pg/mL 387   FOLATE (FOLIC ACID), SERUM Latest Ref Range: 8.7 - 24.0 ng/mL 9.0   Total Protein Latest Ref Range: 6.1 - 8.3 g/dL 6.5   ALBUMIN, SERUM Latest Ref Range: 3.50 - 4.80 g recommended he continue to take iron daily. As he has been tolerating this with continued increment in his Hb I did not think he needed IV iron. Will recheck labs in 3 months. Pending results will decide if we need to push for a repeat GI w/u.  Last sc

## 2018-07-02 ENCOUNTER — HOSPITAL ENCOUNTER (OUTPATIENT)
Dept: PHYSICAL THERAPY | Facility: HOSPITAL | Age: 68
Setting detail: THERAPIES SERIES
Discharge: HOME | End: 2018-07-02
Attending: NURSE PRACTITIONER
Payer: MEDICARE

## 2018-07-02 PROCEDURE — 97140 MANUAL THERAPY 1/> REGIONS: CPT

## 2018-07-02 PROCEDURE — 97110 THERAPEUTIC EXERCISES: CPT

## 2018-07-02 NOTE — PROGRESS NOTES
Dx: Biceps tendinitis, unspecified laterality (M75.20)  Impingement syndrome of shoulder region, unspecified laterality (M75.40)  Osteoarthritis of AC (acromioclavicular) joint (L52.675)          Authorized # of Visits:  Medicare         Next MD visit: non see patient for approximately 3 more visits for improved pain-free AROM, scapular/rotator cuff strength, and begin incorporating functional mobility exercises more.     Date: 6/15/2018 TX#: 2/10 Date: 6/18/2018   TX#: 3/8   Date: 6/20/2018   TX#: 4/8 Date: w/ resistance from PT    Pulleys AAROM into abduction- both with no assist from LUE, and 20% assist from LUE     Seated thoracic reverse NAGs         Added standing rows to HEP          Charges: Manual (x1), Therapeutic Exercise (x2)  Total Timed Treatment

## 2018-07-03 ENCOUNTER — HOSPITAL ENCOUNTER (EMERGENCY)
Facility: HOSPITAL | Age: 68
Discharge: HOME | End: 2018-07-04
Attending: EMERGENCY MEDICINE
Payer: MEDICARE

## 2018-07-03 DIAGNOSIS — E87.1 HYPONATREMIA: Primary | ICD-10-CM

## 2018-07-03 LAB
ALBUMIN SERPL-MCNC: 3.1 G/DL (ref 3.5–4.8)
ALP LIVER SERPL-CCNC: 67 U/L (ref 45–117)
ALT SERPL-CCNC: 22 U/L (ref 17–63)
AST SERPL-CCNC: 21 U/L (ref 15–41)
BASOPHILS # BLD AUTO: 0.04 X10(3) UL (ref 0–0.1)
BASOPHILS NFR BLD AUTO: 0.5 %
BILIRUB SERPL-MCNC: 0.8 MG/DL (ref 0.1–2)
BUN BLD-MCNC: 11 MG/DL (ref 8–20)
CALCIUM BLD-MCNC: 8.4 MG/DL (ref 8.3–10.3)
CHLORIDE: 93 MMOL/L (ref 101–111)
CO2: 23 MMOL/L (ref 22–32)
CREAT BLD-MCNC: 0.77 MG/DL (ref 0.7–1.3)
EOSINOPHIL # BLD AUTO: 0.19 X10(3) UL (ref 0–0.3)
EOSINOPHIL NFR BLD AUTO: 2.5 %
ERYTHROCYTE [DISTWIDTH] IN BLOOD BY AUTOMATED COUNT: 15.8 % (ref 11.5–16)
GLUCOSE BLD-MCNC: 113 MG/DL (ref 65–99)
GLUCOSE BLD-MCNC: 113 MG/DL (ref 70–99)
HCT VFR BLD AUTO: 34.1 % (ref 37–53)
HGB BLD-MCNC: 11.6 G/DL (ref 13–17)
IMMATURE GRANULOCYTE COUNT: 0.04 X10(3) UL (ref 0–1)
IMMATURE GRANULOCYTE RATIO %: 0.5 %
LYMPHOCYTES # BLD AUTO: 2.15 X10(3) UL (ref 0.9–4)
LYMPHOCYTES NFR BLD AUTO: 27.9 %
M PROTEIN MFR SERPL ELPH: 6.7 G/DL (ref 6.1–8.3)
MCH RBC QN AUTO: 30.1 PG (ref 27–33.2)
MCHC RBC AUTO-ENTMCNC: 34 G/DL (ref 31–37)
MCV RBC AUTO: 88.3 FL (ref 80–99)
MONOCYTES # BLD AUTO: 0.72 X10(3) UL (ref 0.1–1)
MONOCYTES NFR BLD AUTO: 9.4 %
NEUTROPHIL ABS PRELIM: 4.56 X10 (3) UL (ref 1.3–6.7)
NEUTROPHILS # BLD AUTO: 4.56 X10(3) UL (ref 1.3–6.7)
NEUTROPHILS NFR BLD AUTO: 59.2 %
PLATELET # BLD AUTO: 306 10(3)UL (ref 150–450)
POTASSIUM SERPL-SCNC: 4.4 MMOL/L (ref 3.6–5.1)
RBC # BLD AUTO: 3.86 X10(6)UL (ref 3.8–5.8)
RED CELL DISTRIBUTION WIDTH-SD: 51.3 FL (ref 35.1–46.3)
SODIUM SERPL-SCNC: 125 MMOL/L (ref 136–144)
TROPONIN: <0.046 NG/ML (ref ?–0.05)
TSI SER-ACNC: 2.43 MIU/ML (ref 0.35–5.5)
WBC # BLD AUTO: 7.7 X10(3) UL (ref 4–13)

## 2018-07-03 PROCEDURE — 84443 ASSAY THYROID STIM HORMONE: CPT | Performed by: EMERGENCY MEDICINE

## 2018-07-03 PROCEDURE — 84484 ASSAY OF TROPONIN QUANT: CPT | Performed by: EMERGENCY MEDICINE

## 2018-07-03 PROCEDURE — 80053 COMPREHEN METABOLIC PANEL: CPT | Performed by: EMERGENCY MEDICINE

## 2018-07-03 PROCEDURE — 93005 ELECTROCARDIOGRAM TRACING: CPT

## 2018-07-03 PROCEDURE — 96360 HYDRATION IV INFUSION INIT: CPT

## 2018-07-03 PROCEDURE — 85025 COMPLETE CBC W/AUTO DIFF WBC: CPT | Performed by: EMERGENCY MEDICINE

## 2018-07-03 PROCEDURE — 99284 EMERGENCY DEPT VISIT MOD MDM: CPT

## 2018-07-03 PROCEDURE — 82962 GLUCOSE BLOOD TEST: CPT

## 2018-07-03 PROCEDURE — 93010 ELECTROCARDIOGRAM REPORT: CPT

## 2018-07-03 PROCEDURE — 99285 EMERGENCY DEPT VISIT HI MDM: CPT

## 2018-07-04 VITALS
TEMPERATURE: 97 F | HEIGHT: 61 IN | BODY MASS INDEX: 37 KG/M2 | DIASTOLIC BLOOD PRESSURE: 70 MMHG | WEIGHT: 196 LBS | SYSTOLIC BLOOD PRESSURE: 148 MMHG | OXYGEN SATURATION: 97 % | HEART RATE: 56 BPM | RESPIRATION RATE: 17 BRPM

## 2018-07-04 LAB
BILIRUB UR QL STRIP.AUTO: NEGATIVE
CLARITY UR REFRACT.AUTO: CLEAR
GLUCOSE UR STRIP.AUTO-MCNC: NEGATIVE MG/DL
KETONES UR STRIP.AUTO-MCNC: NEGATIVE MG/DL
LEUKOCYTE ESTERASE UR QL STRIP.AUTO: NEGATIVE
NITRITE UR QL STRIP.AUTO: NEGATIVE
PH UR STRIP.AUTO: 6 [PH] (ref 4.5–8)
PROT UR STRIP.AUTO-MCNC: NEGATIVE MG/DL
RBC UR QL AUTO: NEGATIVE
SP GR UR STRIP.AUTO: 1.01 (ref 1–1.03)
UROBILINOGEN UR STRIP.AUTO-MCNC: <2 MG/DL

## 2018-07-04 PROCEDURE — 81003 URINALYSIS AUTO W/O SCOPE: CPT | Performed by: EMERGENCY MEDICINE

## 2018-07-04 RX ORDER — FUROSEMIDE 20 MG/1
20 TABLET ORAL DAILY
Qty: 30 TABLET | Refills: 0 | Status: ON HOLD | OUTPATIENT
Start: 2018-07-04 | End: 2018-07-27

## 2018-07-04 RX ORDER — POTASSIUM CHLORIDE 750 MG/1
10 TABLET, EXTENDED RELEASE ORAL DAILY
Qty: 30 TABLET | Refills: 0 | Status: ON HOLD | OUTPATIENT
Start: 2018-07-04 | End: 2018-07-27

## 2018-07-04 RX ORDER — SODIUM CHLORIDE 1000 MG
1 TABLET, SOLUBLE MISCELLANEOUS 2 TIMES DAILY WITH MEALS
Qty: 28 TABLET | Refills: 0 | Status: SHIPPED | OUTPATIENT
Start: 2018-07-04 | End: 2018-07-16

## 2018-07-04 NOTE — ED INITIAL ASSESSMENT (HPI)
Pt reports having dizziness since yesterday. Denies any pain. Denies vision changes. Denies headache.

## 2018-07-04 NOTE — ED NOTES
Discharge instructions were reviewed and pt verbalized understanding. Pt is AxOx4, ambulatory with steady gait, and denies any dizziness or discomfort at this time. Pt is going home with family.

## 2018-07-04 NOTE — ED PROVIDER NOTES
Patient Seen in: [facility] Emergency Department    History   Patient presents with:  Dizziness (neurologic)    Stated Complaint: dizziness     HPI    Weakness for 3 days-no other symptoms at all just weakness and very nonspecific dizziness he state except as noted above.     Physical Exam   ED Triage Vitals [07/03/18 1311]  BP: 150/79  Pulse: 56  Resp: 14  Temp: 97.2 °F (36.2 °C)  Temp src: Temporal  SpO2: 97 %  O2 Device: None (Room air)    Current:/70   Pulse 56   Temp 97.2 °F (36.2 °C) (Tempo following:        Result Value    Glucose 113 (*)     Albumin 3.1 (*)     Sodium 125 (*)     Chloride 93 (*)     All other components within normal limits   POCT GLUCOSE - Abnormal; Notable for the following:     POC Glucose 113 (*)     All other component this time no further intervention is indicated. Patient did have a liter of IV fluids here in the emergency department sodium chloride.   He will follow-up with Dr. Obi Major in the coming week to recheck his sodium        Disposition and Plan     Clinical Impr

## 2018-07-05 LAB
ATRIAL RATE: 55 BPM
P AXIS: 38 DEGREES
P-R INTERVAL: 194 MS
Q-T INTERVAL: 422 MS
QRS DURATION: 74 MS
QTC CALCULATION (BEZET): 403 MS
R AXIS: -17 DEGREES
T AXIS: 19 DEGREES
VENTRICULAR RATE: 55 BPM

## 2018-07-06 ENCOUNTER — APPOINTMENT (OUTPATIENT)
Dept: PHYSICAL THERAPY | Facility: HOSPITAL | Age: 68
End: 2018-07-06
Payer: MEDICARE

## 2018-07-09 ENCOUNTER — APPOINTMENT (OUTPATIENT)
Dept: PHYSICAL THERAPY | Facility: HOSPITAL | Age: 68
End: 2018-07-09
Payer: MEDICARE

## 2018-07-10 ENCOUNTER — NURSE ONLY (OUTPATIENT)
Dept: ENDOCRINOLOGY CLINIC | Facility: CLINIC | Age: 68
End: 2018-07-10

## 2018-07-10 VITALS
HEART RATE: 52 BPM | SYSTOLIC BLOOD PRESSURE: 143 MMHG | WEIGHT: 202 LBS | HEIGHT: 66 IN | DIASTOLIC BLOOD PRESSURE: 72 MMHG | BODY MASS INDEX: 32.47 KG/M2

## 2018-07-10 DIAGNOSIS — E11.9 TYPE 2 DIABETES MELLITUS WITHOUT COMPLICATION, WITH LONG-TERM CURRENT USE OF INSULIN (HCC): Primary | ICD-10-CM

## 2018-07-10 DIAGNOSIS — Z79.4 TYPE 2 DIABETES MELLITUS WITHOUT COMPLICATION, WITH LONG-TERM CURRENT USE OF INSULIN (HCC): Primary | ICD-10-CM

## 2018-07-10 PROCEDURE — G0108 DIAB MANAGE TRN  PER INDIV: HCPCS

## 2018-07-10 NOTE — PROGRESS NOTES
Shell Brady  : 1950 was seen for Diabetic Education Follow up:    Date: 7/10/2018   Start time: 11:30 End time: 12noon    Assessment:     Assessment: /72   Pulse 52   Ht 66\"   Wt 202 lb   BMI 32.60 kg/m²        HEMOGLOBIN A1C (%)   Date Carisa g/dL   Albumin 3.1 (L) 3.5 - 4.8 g/dL   Sodium 125 (L) 136 - 144 mmol/L   Potassium 4.4 3.6 - 5.1 mmol/L   Chloride 93 (L) 101 - 111 mmol/L   CO2 23.0 22.0 - 32.0 mmol/L   -URINALYSIS WITH CULTURE REFLEX   Result Value Ref Range   Urine Color Straw Yellow Absolute 2.15 0.90 - 4.00 x10(3) uL   Monocyte Absolute 0.72 0.10 - 1.00 x10(3) uL   Eosinophil Absolute 0.19 0.00 - 0.30 x10(3) uL   Basophil Absolute 0.04 0.00 - 0.10 x10(3) uL   Immature Granulocyte Absolute 0.04 0.00 - 1.00 x10(3) uL   Neutrophil % 59.

## 2018-07-11 ENCOUNTER — APPOINTMENT (OUTPATIENT)
Dept: PHYSICAL THERAPY | Facility: HOSPITAL | Age: 68
End: 2018-07-11
Payer: MEDICARE

## 2018-07-11 ENCOUNTER — HOSPITAL ENCOUNTER (OUTPATIENT)
Dept: PHYSICAL THERAPY | Facility: HOSPITAL | Age: 68
Setting detail: THERAPIES SERIES
Discharge: HOME | End: 2018-07-11
Attending: INTERNAL MEDICINE
Payer: MEDICARE

## 2018-07-11 PROCEDURE — 97140 MANUAL THERAPY 1/> REGIONS: CPT

## 2018-07-11 PROCEDURE — 97110 THERAPEUTIC EXERCISES: CPT

## 2018-07-11 NOTE — PROGRESS NOTES
Dx: Biceps tendinitis, unspecified laterality (M75.20)  Impingement syndrome of shoulder region, unspecified laterality (M75.40)  Osteoarthritis of AC (acromioclavicular) joint (D35.899)          Authorized # of Visits:  Medicare         Next MD visit: non deg to be able to reach in back pocket, tuck in shirt, and turn steering wheel without pain (10 visits)  · Pt will be independent and compliant with comprehensive HEP to maintain progress achieved in PT (10 visits)    Plan: Work more functional activity ex mobilizations Standing rows- red band  Standing shoulder ER-red band Pulleys AAROM into abduction- both with no assist from LUE, and 20% assist from LUE Prone shoulder horizontal abduction- 3x12, LUE Seated rows- green band   Review HEP Scapular retraction

## 2018-07-16 ENCOUNTER — APPOINTMENT (OUTPATIENT)
Dept: LAB | Age: 68
End: 2018-07-16
Attending: INTERNAL MEDICINE
Payer: MEDICARE

## 2018-07-16 ENCOUNTER — TELEPHONE (OUTPATIENT)
Dept: INTERNAL MEDICINE CLINIC | Facility: CLINIC | Age: 68
End: 2018-07-16

## 2018-07-16 ENCOUNTER — OFFICE VISIT (OUTPATIENT)
Dept: INTERNAL MEDICINE CLINIC | Facility: CLINIC | Age: 68
End: 2018-07-16

## 2018-07-16 VITALS
HEART RATE: 56 BPM | WEIGHT: 201 LBS | SYSTOLIC BLOOD PRESSURE: 136 MMHG | OXYGEN SATURATION: 97 % | DIASTOLIC BLOOD PRESSURE: 60 MMHG | TEMPERATURE: 99 F | BODY MASS INDEX: 32.3 KG/M2 | HEIGHT: 66 IN | RESPIRATION RATE: 16 BRPM

## 2018-07-16 DIAGNOSIS — E87.1 HYPONATREMIA: Primary | ICD-10-CM

## 2018-07-16 DIAGNOSIS — K21.9 GASTROESOPHAGEAL REFLUX DISEASE WITHOUT ESOPHAGITIS: ICD-10-CM

## 2018-07-16 DIAGNOSIS — E87.1 HYPONATREMIA: ICD-10-CM

## 2018-07-16 DIAGNOSIS — R53.83 FATIGUE, UNSPECIFIED TYPE: ICD-10-CM

## 2018-07-16 LAB
ALBUMIN SERPL-MCNC: 3.3 G/DL (ref 3.5–4.8)
ALP LIVER SERPL-CCNC: 74 U/L (ref 45–117)
ALT SERPL-CCNC: 21 U/L (ref 17–63)
AST SERPL-CCNC: 20 U/L (ref 15–41)
BILIRUB SERPL-MCNC: 0.7 MG/DL (ref 0.1–2)
BUN BLD-MCNC: 12 MG/DL (ref 8–20)
CALCIUM BLD-MCNC: 8.7 MG/DL (ref 8.3–10.3)
CHLORIDE: 99 MMOL/L (ref 101–111)
CO2: 22 MMOL/L (ref 22–32)
CREAT BLD-MCNC: 1 MG/DL (ref 0.7–1.3)
GLUCOSE BLD-MCNC: 187 MG/DL (ref 70–99)
M PROTEIN MFR SERPL ELPH: 7.1 G/DL (ref 6.1–8.3)
POTASSIUM SERPL-SCNC: 4.3 MMOL/L (ref 3.6–5.1)
SODIUM SERPL-SCNC: 132 MMOL/L (ref 136–144)

## 2018-07-16 PROCEDURE — 36415 COLL VENOUS BLD VENIPUNCTURE: CPT

## 2018-07-16 PROCEDURE — 80053 COMPREHEN METABOLIC PANEL: CPT

## 2018-07-16 PROCEDURE — 99214 OFFICE O/P EST MOD 30 MIN: CPT | Performed by: INTERNAL MEDICINE

## 2018-07-16 RX ORDER — BISOPROLOL FUMARATE 10 MG/1
10 TABLET ORAL DAILY
Qty: 90 TABLET | Refills: 1 | Status: ON HOLD | OUTPATIENT
Start: 2018-07-16 | End: 2018-07-27

## 2018-07-16 RX ORDER — OMEPRAZOLE 40 MG/1
40 CAPSULE, DELAYED RELEASE ORAL 2 TIMES DAILY
Qty: 180 CAPSULE | Refills: 1 | Status: SHIPPED | OUTPATIENT
Start: 2018-07-16 | End: 2019-08-21

## 2018-07-16 RX ORDER — SODIUM CHLORIDE 1000 MG
1 TABLET, SOLUBLE MISCELLANEOUS
Qty: 90 TABLET | Refills: 0 | Status: ON HOLD | OUTPATIENT
Start: 2018-07-16 | End: 2018-07-27

## 2018-07-16 NOTE — PROGRESS NOTES
Ian Knox is a 80year old male. HPI:   Patient presents with:  ER F/U: Went to Er and dx with low sodium levels. Nephrologist rescheduled appointment for 7/17 but pt not feeling well. Weakness  Patient presents with acute complaints of fatigue.   He daily., Disp: 30 tablet, Rfl: 0  •  glipiZIDE 5 MG Oral Tab, Take 1 tablet (5 mg total) by mouth 2 (two) times daily before meals. , Disp: 60 tablet, Rfl: 3  •  MetFORMIN HCl 1000 MG Oral Tab, Take 1 tablet (1,000 mg total) by mouth 2 (two) times daily with no wheezing/rubs  CARDIO: RRR without murmurs. No clubbing, cyanosis or edema.   GI: soft non tender nondistended no hepatosplenomegaly, bowel sounds throughout  NEURO: CN II-XII intact, 5/5 strength all extremities  MS: Full ROM  PSYCH: pleasant, appropri

## 2018-07-16 NOTE — TELEPHONE ENCOUNTER
Patient Daughter calling in on behalf of her Father, Oswaldo Morales. Stated he went to the Hospital last week and since then has not been feeling too well. Looking to be seen today. No appointments available.     Please call pt Daughter to discuss appointment

## 2018-07-16 NOTE — PATIENT INSTRUCTIONS
- We will check your sodium levels today  - Take an extra sodium tablet today. If your fatigue is not better by the evening, you can take another tablet  - Follow up with the kidney doctor tomorrow as scheduled.     It was a pleasure seeing you in the clin

## 2018-07-17 ENCOUNTER — OFFICE VISIT (OUTPATIENT)
Dept: NEPHROLOGY | Facility: CLINIC | Age: 68
End: 2018-07-17
Payer: MEDICARE

## 2018-07-17 VITALS — DIASTOLIC BLOOD PRESSURE: 72 MMHG | SYSTOLIC BLOOD PRESSURE: 146 MMHG | BODY MASS INDEX: 32 KG/M2 | WEIGHT: 199 LBS

## 2018-07-17 DIAGNOSIS — R53.83 OTHER FATIGUE: ICD-10-CM

## 2018-07-17 DIAGNOSIS — E87.1 HYPONATREMIA: Primary | ICD-10-CM

## 2018-07-17 DIAGNOSIS — G47.30 SLEEP APNEA, UNSPECIFIED TYPE: ICD-10-CM

## 2018-07-17 PROCEDURE — 99214 OFFICE O/P EST MOD 30 MIN: CPT | Performed by: INTERNAL MEDICINE

## 2018-07-17 NOTE — PROGRESS NOTES
Nephrology Progress Note      ASSESSMENT/PLAN:        1) Hyponatremia- Na has been approx 130 meq/L since 2015 and appears to be due to a combination of age associated impairment of renal diluting capacity combined with generous fluid intake as he drinks a reveal any abnormalities. He also underwent endoscopy and colonoscopy and was placed on a proton pump inhibitor. Denies any history of acute or chronic renal failure gross microscopic hematuria proteinuria kidney stones or infections.   No history of card daily. Disp: 90 tablet Rfl: 1   Omeprazole 40 MG Oral Capsule Delayed Release Take 1 capsule (40 mg total) by mouth 2 (two) times daily.  Disp: 180 capsule Rfl: 1   sodium chloride 1 g Oral Tab Take 1 tablet (1 g total) by mouth 3 (three) times daily with m distress. HEENT: No scleral icterus, MMM  Neck: Supple, no ARSENIO or thyromegaly  Cardiac: Regular rate and rhythm, S1, S2 normal, no murmur or rub  Lungs: Clear without wheezes, rales, rhonchi.     Abdomen: Soft, non-tender. + bowel sounds, no palpable organ

## 2018-07-19 DIAGNOSIS — G47.10 HYPERSOMNIA: ICD-10-CM

## 2018-07-19 DIAGNOSIS — R06.83 SNORING: Primary | ICD-10-CM

## 2018-07-19 DIAGNOSIS — R53.83 FATIGUE, UNSPECIFIED TYPE: ICD-10-CM

## 2018-07-23 ENCOUNTER — HOSPITAL ENCOUNTER (OUTPATIENT)
Dept: PHYSICAL THERAPY | Facility: HOSPITAL | Age: 68
Setting detail: THERAPIES SERIES
Discharge: HOME | End: 2018-07-23
Attending: INTERNAL MEDICINE
Payer: MEDICARE

## 2018-07-23 PROCEDURE — 97110 THERAPEUTIC EXERCISES: CPT

## 2018-07-23 PROCEDURE — 97140 MANUAL THERAPY 1/> REGIONS: CPT

## 2018-07-23 NOTE — PROGRESS NOTES
Dx: Biceps tendinitis, unspecified laterality (M75.20)  Impingement syndrome of shoulder region, unspecified laterality (M75.40)  Osteoarthritis of AC (acromioclavicular) joint (B46.008)          Authorized # of Visits:  Medicare         Next MD visit: non compliant with comprehensive HEP to maintain progress achieved in PT (10 visits)    Plan: Monitor closely and determine appropriateness for future therapy. Work more functional activity exercises in future sessions.   Address patient complaints of pain whe LUE Sidelying L shoulder ER- 1# Sliding LUE up wall with leaning in for passive stretch- 2x10 Serratus punch ball on wall w/ rolling back/forth   Scapular retraction mobilizations Standing rows- red band  Standing shoulder ER-red band Pulleys AAROM into ab

## 2018-07-24 ENCOUNTER — APPOINTMENT (OUTPATIENT)
Dept: GENERAL RADIOLOGY | Facility: HOSPITAL | Age: 68
DRG: 552 | End: 2018-07-24
Attending: EMERGENCY MEDICINE
Payer: MEDICARE

## 2018-07-24 ENCOUNTER — HOSPITAL ENCOUNTER (INPATIENT)
Facility: HOSPITAL | Age: 68
LOS: 1 days | Discharge: HOME | DRG: 552 | End: 2018-07-27
Attending: EMERGENCY MEDICINE | Admitting: HOSPITALIST
Payer: MEDICARE

## 2018-07-24 DIAGNOSIS — M54.9 INTRACTABLE BACK PAIN: Primary | ICD-10-CM

## 2018-07-24 DIAGNOSIS — E87.1 HYPONATREMIA: ICD-10-CM

## 2018-07-24 DIAGNOSIS — R50.9 FEVER, UNSPECIFIED FEVER CAUSE: ICD-10-CM

## 2018-07-24 DIAGNOSIS — M54.2 NECK PAIN, ACUTE: ICD-10-CM

## 2018-07-24 LAB
ALBUMIN SERPL-MCNC: 3.2 G/DL (ref 3.5–4.8)
ALBUMIN/GLOB SERPL: 0.9 {RATIO} (ref 1–2)
ALP LIVER SERPL-CCNC: 74 U/L (ref 45–117)
ALT SERPL-CCNC: 20 U/L (ref 17–63)
ANION GAP SERPL CALC-SCNC: 11 MMOL/L (ref 0–18)
APTT PPP: 30.7 SECONDS (ref 26.1–34.6)
AST SERPL-CCNC: 21 U/L (ref 15–41)
BASOPHILS # BLD AUTO: 0.04 X10(3) UL (ref 0–0.1)
BASOPHILS NFR BLD AUTO: 0.4 %
BILIRUB SERPL-MCNC: 0.8 MG/DL (ref 0.1–2)
BUN BLD-MCNC: 13 MG/DL (ref 8–20)
BUN/CREAT SERPL: 14.3 (ref 10–20)
CALCIUM BLD-MCNC: 8.8 MG/DL (ref 8.3–10.3)
CHLORIDE SERPL-SCNC: 93 MMOL/L (ref 101–111)
CO2 SERPL-SCNC: 24 MMOL/L (ref 22–32)
CREAT BLD-MCNC: 0.91 MG/DL (ref 0.7–1.3)
D-DIMER: 0.73 UG/ML FEU (ref 0–0.49)
EOSINOPHIL # BLD AUTO: 0.38 X10(3) UL (ref 0–0.3)
EOSINOPHIL NFR BLD AUTO: 4 %
ERYTHROCYTE [DISTWIDTH] IN BLOOD BY AUTOMATED COUNT: 14.6 % (ref 11.5–16)
GLOBULIN PLAS-MCNC: 3.6 G/DL (ref 2.5–3.7)
GLUCOSE BLD-MCNC: 175 MG/DL (ref 70–99)
HCT VFR BLD AUTO: 37.6 % (ref 37–53)
HGB BLD-MCNC: 12.6 G/DL (ref 13–17)
IMMATURE GRANULOCYTE COUNT: 0.03 X10(3) UL (ref 0–1)
IMMATURE GRANULOCYTE RATIO %: 0.3 %
INR BLD: 0.89 (ref 0.9–1.1)
LYMPHOCYTES # BLD AUTO: 1.94 X10(3) UL (ref 0.9–4)
LYMPHOCYTES NFR BLD AUTO: 20.4 %
M PROTEIN MFR SERPL ELPH: 6.8 G/DL (ref 6.1–8.3)
MCH RBC QN AUTO: 30.1 PG (ref 27–33.2)
MCHC RBC AUTO-ENTMCNC: 33.5 G/DL (ref 31–37)
MCV RBC AUTO: 90 FL (ref 80–99)
MONOCYTES # BLD AUTO: 0.76 X10(3) UL (ref 0.1–1)
MONOCYTES NFR BLD AUTO: 8 %
NEUTROPHIL ABS PRELIM: 6.36 X10 (3) UL (ref 1.3–6.7)
NEUTROPHILS # BLD AUTO: 6.36 X10(3) UL (ref 1.3–6.7)
NEUTROPHILS NFR BLD AUTO: 66.9 %
OSMOLALITY SERPL CALC.SUM OF ELEC: 270 MOSM/KG (ref 275–295)
PLATELET # BLD AUTO: 330 10(3)UL (ref 150–450)
POTASSIUM SERPL-SCNC: 4.8 MMOL/L (ref 3.6–5.1)
PSA SERPL DL<=0.01 NG/ML-MCNC: 12.4 SECONDS (ref 12.4–14.7)
RBC # BLD AUTO: 4.18 X10(6)UL (ref 3.8–5.8)
RED CELL DISTRIBUTION WIDTH-SD: 48.5 FL (ref 35.1–46.3)
SODIUM SERPL-SCNC: 128 MMOL/L (ref 136–144)
TROPONIN I SERPL-MCNC: <0.046 NG/ML (ref ?–0.05)
WBC # BLD AUTO: 9.5 X10(3) UL (ref 4–13)

## 2018-07-24 PROCEDURE — 71045 X-RAY EXAM CHEST 1 VIEW: CPT | Performed by: EMERGENCY MEDICINE

## 2018-07-24 RX ORDER — ONDANSETRON 2 MG/ML
4 INJECTION INTRAMUSCULAR; INTRAVENOUS ONCE
Status: COMPLETED | OUTPATIENT
Start: 2018-07-24 | End: 2018-07-24

## 2018-07-24 RX ORDER — MORPHINE SULFATE 4 MG/ML
4 INJECTION, SOLUTION INTRAMUSCULAR; INTRAVENOUS EVERY 30 MIN PRN
Status: DISCONTINUED | OUTPATIENT
Start: 2018-07-24 | End: 2018-07-25

## 2018-07-25 ENCOUNTER — APPOINTMENT (OUTPATIENT)
Dept: CT IMAGING | Facility: HOSPITAL | Age: 68
DRG: 552 | End: 2018-07-25
Attending: EMERGENCY MEDICINE
Payer: MEDICARE

## 2018-07-25 ENCOUNTER — APPOINTMENT (OUTPATIENT)
Dept: MRI IMAGING | Facility: HOSPITAL | Age: 68
DRG: 552 | End: 2018-07-25
Attending: HOSPITALIST
Payer: MEDICARE

## 2018-07-25 ENCOUNTER — APPOINTMENT (OUTPATIENT)
Dept: GENERAL RADIOLOGY | Facility: HOSPITAL | Age: 68
DRG: 552 | End: 2018-07-25
Attending: EMERGENCY MEDICINE
Payer: MEDICARE

## 2018-07-25 ENCOUNTER — APPOINTMENT (OUTPATIENT)
Dept: MRI IMAGING | Facility: HOSPITAL | Age: 68
DRG: 552 | End: 2018-07-25
Attending: EMERGENCY MEDICINE
Payer: MEDICARE

## 2018-07-25 PROBLEM — M54.9 INTRACTABLE BACK PAIN: Status: ACTIVE | Noted: 2018-07-25

## 2018-07-25 PROBLEM — R73.9 HYPERGLYCEMIA: Status: ACTIVE | Noted: 2018-07-25

## 2018-07-25 PROBLEM — R50.9 FEVER, UNSPECIFIED FEVER CAUSE: Status: ACTIVE | Noted: 2018-07-25

## 2018-07-25 PROBLEM — M54.2 NECK PAIN, ACUTE: Status: ACTIVE | Noted: 2018-07-25

## 2018-07-25 LAB
ADENOVIRUS PCR:: NEGATIVE
ATRIAL RATE: 63 BPM
B PERT DNA SPEC QL NAA+PROBE: NEGATIVE
BASOPHILS # BLD AUTO: 0.05 X10(3) UL (ref 0–0.1)
BASOPHILS NFR BLD AUTO: 0.3 %
BILIRUB UR QL STRIP.AUTO: NEGATIVE
C PNEUM DNA SPEC QL NAA+PROBE: NEGATIVE
CLARITY UR REFRACT.AUTO: CLEAR
COLOR UR AUTO: YELLOW
CORONAVIRUS 229E PCR:: NEGATIVE
CORONAVIRUS HKU1 PCR:: NEGATIVE
CORONAVIRUS NL63 PCR:: NEGATIVE
CORONAVIRUS OC43 PCR:: NEGATIVE
CRP SERPL-MCNC: 0.73 MG/DL (ref ?–1)
EOSINOPHIL # BLD AUTO: 0.26 X10(3) UL (ref 0–0.3)
EOSINOPHIL NFR BLD AUTO: 1.7 %
ERYTHROCYTE [DISTWIDTH] IN BLOOD BY AUTOMATED COUNT: 14.7 % (ref 11.5–16)
EST. AVERAGE GLUCOSE BLD GHB EST-MCNC: 134 MG/DL (ref 68–126)
FLUAV RNA SPEC QL NAA+PROBE: NEGATIVE
FLUBV RNA SPEC QL NAA+PROBE: NEGATIVE
GLUCOSE BLD-MCNC: 107 MG/DL (ref 65–99)
GLUCOSE BLD-MCNC: 119 MG/DL (ref 65–99)
GLUCOSE BLD-MCNC: 125 MG/DL (ref 65–99)
GLUCOSE BLD-MCNC: 133 MG/DL (ref 65–99)
GLUCOSE BLD-MCNC: 225 MG/DL (ref 65–99)
GLUCOSE UR STRIP.AUTO-MCNC: NEGATIVE MG/DL
HBA1C MFR BLD HPLC: 6.3 % (ref ?–5.7)
HCT VFR BLD AUTO: 36.6 % (ref 37–53)
HGB BLD-MCNC: 12.3 G/DL (ref 13–17)
IMMATURE GRANULOCYTE COUNT: 0.06 X10(3) UL (ref 0–1)
IMMATURE GRANULOCYTE RATIO %: 0.4 %
KETONES UR STRIP.AUTO-MCNC: NEGATIVE MG/DL
LACTIC ACID: 1.6 MMOL/L (ref 0.5–2)
LACTIC ACID: 1.9 MMOL/L (ref 0.5–2)
LACTIC ACID: 2.7 MMOL/L (ref 0.5–2)
LEUKOCYTE ESTERASE UR QL STRIP.AUTO: NEGATIVE
LIPASE: 161 U/L (ref 73–393)
LYMPHOCYTES # BLD AUTO: 1.39 X10(3) UL (ref 0.9–4)
LYMPHOCYTES NFR BLD AUTO: 9 %
MCH RBC QN AUTO: 30.2 PG (ref 27–33.2)
MCHC RBC AUTO-ENTMCNC: 33.6 G/DL (ref 31–37)
MCV RBC AUTO: 89.9 FL (ref 80–99)
METAPNEUMOVIRUS PCR:: NEGATIVE
MONOCYTES # BLD AUTO: 0.8 X10(3) UL (ref 0.1–1)
MONOCYTES NFR BLD AUTO: 5.2 %
MYCOPLASMA PNEUMONIA PCR:: NEGATIVE
NEUTROPHIL ABS PRELIM: 12.95 X10 (3) UL (ref 1.3–6.7)
NEUTROPHILS # BLD AUTO: 12.95 X10(3) UL (ref 1.3–6.7)
NEUTROPHILS NFR BLD AUTO: 83.4 %
NITRITE UR QL STRIP.AUTO: NEGATIVE
P AXIS: 37 DEGREES
P-R INTERVAL: 200 MS
PARAINFLUENZA 1 PCR:: NEGATIVE
PARAINFLUENZA 2 PCR:: NEGATIVE
PARAINFLUENZA 3 PCR:: NEGATIVE
PARAINFLUENZA 4 PCR:: NEGATIVE
PH UR STRIP.AUTO: 5 [PH] (ref 4.5–8)
PLATELET # BLD AUTO: 312 10(3)UL (ref 150–450)
PROCALCITONIN SERPL-MCNC: <0.11 NG/ML
PROT UR STRIP.AUTO-MCNC: NEGATIVE MG/DL
Q-T INTERVAL: 408 MS
QRS DURATION: 72 MS
QTC CALCULATION (BEZET): 417 MS
R AXIS: -18 DEGREES
RBC # BLD AUTO: 4.07 X10(6)UL (ref 3.8–5.8)
RBC UR QL AUTO: NEGATIVE
RED CELL DISTRIBUTION WIDTH-SD: 48.8 FL (ref 35.1–46.3)
RHINOVIRUS/ENTERO PCR:: NEGATIVE
RSV RNA SPEC QL NAA+PROBE: NEGATIVE
SED RATE-ML: 15 MM/HR (ref 0–12)
SP GR UR STRIP.AUTO: 1.01 (ref 1–1.03)
T AXIS: 11 DEGREES
UROBILINOGEN UR STRIP.AUTO-MCNC: <2 MG/DL
VENTRICULAR RATE: 63 BPM
WBC # BLD AUTO: 15.5 X10(3) UL (ref 4–13)

## 2018-07-25 PROCEDURE — 72156 MRI NECK SPINE W/O & W/DYE: CPT | Performed by: HOSPITALIST

## 2018-07-25 PROCEDURE — A9576 INJ PROHANCE MULTIPACK: HCPCS

## 2018-07-25 PROCEDURE — 70498 CT ANGIOGRAPHY NECK: CPT | Performed by: EMERGENCY MEDICINE

## 2018-07-25 PROCEDURE — 72100 X-RAY EXAM L-S SPINE 2/3 VWS: CPT | Performed by: EMERGENCY MEDICINE

## 2018-07-25 PROCEDURE — 72158 MRI LUMBAR SPINE W/O & W/DYE: CPT | Performed by: HOSPITALIST

## 2018-07-25 PROCEDURE — 99223 1ST HOSP IP/OBS HIGH 75: CPT | Performed by: HOSPITALIST

## 2018-07-25 PROCEDURE — 71275 CT ANGIOGRAPHY CHEST: CPT | Performed by: EMERGENCY MEDICINE

## 2018-07-25 PROCEDURE — 72131 CT LUMBAR SPINE W/O DYE: CPT | Performed by: EMERGENCY MEDICINE

## 2018-07-25 RX ORDER — SODIUM CHLORIDE 9 MG/ML
INJECTION, SOLUTION INTRAVENOUS ONCE
Status: COMPLETED | OUTPATIENT
Start: 2018-07-25 | End: 2018-07-25

## 2018-07-25 RX ORDER — PANTOPRAZOLE SODIUM 40 MG/1
40 TABLET, DELAYED RELEASE ORAL
Status: DISCONTINUED | OUTPATIENT
Start: 2018-07-25 | End: 2018-07-27

## 2018-07-25 RX ORDER — ONDANSETRON 2 MG/ML
4 INJECTION INTRAMUSCULAR; INTRAVENOUS ONCE
Status: COMPLETED | OUTPATIENT
Start: 2018-07-25 | End: 2018-07-25

## 2018-07-25 RX ORDER — ACETAMINOPHEN 325 MG/1
650 TABLET ORAL EVERY 6 HOURS PRN
Status: DISCONTINUED | OUTPATIENT
Start: 2018-07-25 | End: 2018-07-27

## 2018-07-25 RX ORDER — LORAZEPAM 2 MG/ML
0.5 INJECTION INTRAMUSCULAR EVERY 10 MIN PRN
Status: DISCONTINUED | OUTPATIENT
Start: 2018-07-25 | End: 2018-07-27

## 2018-07-25 RX ORDER — METOCLOPRAMIDE HYDROCHLORIDE 5 MG/ML
10 INJECTION INTRAMUSCULAR; INTRAVENOUS ONCE
Status: COMPLETED | OUTPATIENT
Start: 2018-07-25 | End: 2018-07-25

## 2018-07-25 RX ORDER — DEXTROSE MONOHYDRATE 25 G/50ML
50 INJECTION, SOLUTION INTRAVENOUS
Status: DISCONTINUED | OUTPATIENT
Start: 2018-07-25 | End: 2018-07-27

## 2018-07-25 RX ORDER — METOPROLOL TARTRATE 100 MG/1
100 TABLET ORAL
Status: DISCONTINUED | OUTPATIENT
Start: 2018-07-25 | End: 2018-07-26

## 2018-07-25 RX ORDER — ACETAMINOPHEN 500 MG
1000 TABLET ORAL ONCE
Status: COMPLETED | OUTPATIENT
Start: 2018-07-25 | End: 2018-07-25

## 2018-07-25 RX ORDER — ONDANSETRON 2 MG/ML
4 INJECTION INTRAMUSCULAR; INTRAVENOUS ONCE
Status: DISCONTINUED | OUTPATIENT
Start: 2018-07-25 | End: 2018-07-25

## 2018-07-25 RX ORDER — ONDANSETRON 2 MG/ML
4 INJECTION INTRAMUSCULAR; INTRAVENOUS EVERY 6 HOURS PRN
Status: DISCONTINUED | OUTPATIENT
Start: 2018-07-25 | End: 2018-07-27

## 2018-07-25 RX ORDER — ONDANSETRON 2 MG/ML
4 INJECTION INTRAMUSCULAR; INTRAVENOUS EVERY 4 HOURS PRN
Status: DISCONTINUED | OUTPATIENT
Start: 2018-07-25 | End: 2018-07-27

## 2018-07-25 RX ORDER — KETOROLAC TROMETHAMINE 15 MG/ML
15 INJECTION, SOLUTION INTRAMUSCULAR; INTRAVENOUS EVERY 6 HOURS PRN
Status: DISCONTINUED | OUTPATIENT
Start: 2018-07-25 | End: 2018-07-26

## 2018-07-25 RX ORDER — HYDROMORPHONE HYDROCHLORIDE 1 MG/ML
0.5 INJECTION, SOLUTION INTRAMUSCULAR; INTRAVENOUS; SUBCUTANEOUS EVERY 30 MIN PRN
Status: ACTIVE | OUTPATIENT
Start: 2018-07-25 | End: 2018-07-25

## 2018-07-25 RX ORDER — ENOXAPARIN SODIUM 100 MG/ML
40 INJECTION SUBCUTANEOUS DAILY
Status: DISCONTINUED | OUTPATIENT
Start: 2018-07-25 | End: 2018-07-27

## 2018-07-25 RX ORDER — FUROSEMIDE 40 MG/1
20 TABLET ORAL DAILY
Status: CANCELLED | OUTPATIENT
Start: 2018-07-25

## 2018-07-25 RX ORDER — DIPHENHYDRAMINE HYDROCHLORIDE 50 MG/ML
25 INJECTION INTRAMUSCULAR; INTRAVENOUS ONCE
Status: COMPLETED | OUTPATIENT
Start: 2018-07-25 | End: 2018-07-25

## 2018-07-25 RX ORDER — MORPHINE SULFATE 4 MG/ML
2 INJECTION, SOLUTION INTRAMUSCULAR; INTRAVENOUS EVERY 4 HOURS PRN
Status: DISCONTINUED | OUTPATIENT
Start: 2018-07-25 | End: 2018-07-27

## 2018-07-25 NOTE — ED INITIAL ASSESSMENT (HPI)
PT TO THE MID BACK AND RADIATES TO THE R NECK. ONSET THIS AM. NO INJURY AND NO HX OF BACK PAIN IN PAST.

## 2018-07-25 NOTE — PLAN OF CARE
NURSING ADMISSION NOTE      Patient admitted via cart from ER  Oriented to room. Safety precautions initiated. Bed in low position. Call light in reach. Plan of care reviewed. Patient placed on droplet isolation to R/O flu. Family at bedside.

## 2018-07-25 NOTE — ED PROVIDER NOTES
Patient Seen in: [facility] Emergency Department    History   Patient presents with:  Neck Pain (musculoskeletal, neurologic)    Stated Complaint: right neck pain    HPI    Patient presents with right neck pain and low back pain.   The patient states ring, hiatal hernia        Smoking status: Never Smoker                                                              Smokeless tobacco: Never Used                      Alcohol use:  No                Review of Systems    Positive for stated complaint: right Narrative:     FEU = Fibrinogen Equivalent Units.     D-Dimer results of less than 0.5 ug/mL (FEU) have been shown to contribute to the exclusion of venous thromboembolism with a negative predictive value of approximately 95% when results are used as par offered no additional history at this time. FINDINGS:  Suboptimal examination secondary to rightward rotation. There are linear opacities within the mid lungs and lung bases which likely represent atelectasis and/or scarring.   No lobar consolidation is vertebral body and disc space heights are grossly preserved.     ED Course as of Jul 25 0550  ------------------------------------------------------------  Medications   morphINE sulfate (PF) 4 MG/ML injection 4 mg (4 mg Intravenous Given 7/25/18 0048)   so tenderness. His lactic acid has come back elevated and he was started on more aggressive IV fluid resuscitation as well as Zosyn for broad-spectrum antibiotic coverage. The only localizing symptom remains to be the back pain.   He was ordered for an MRI b

## 2018-07-25 NOTE — H&P
[facility]  History and Physical     Jennifer Patch Patient Status:  Emergency    1950 MRN [id number]   Location [address] [facility] Attending Ashley Mak MD   Hosp Day # 0 PCP Amanda Bower MD     Chief Complaint: EoE/malignancy),                schatzki's ring, hiatal hernia    Social History:  reports that he has never smoked. He has never used smokeless tobacco. He reports that he does not drink alcohol or use drugs.     Family History:   Family History   Problem membranes. EOM-I. PERRLA. Anicteric. Neck: No JVD. No carotid bruits. Respiratory: Clear to auscultation bilaterally. No wheezes. No rhonchi. Cardiovascular: S1, S2. Regular rate and rhythm. No murmurs, rubs or gallops. Equal pulses.    Chest and Back: N

## 2018-07-26 LAB
GLUCOSE BLD-MCNC: 103 MG/DL (ref 65–99)
GLUCOSE BLD-MCNC: 107 MG/DL (ref 65–99)
GLUCOSE BLD-MCNC: 199 MG/DL (ref 65–99)
GLUCOSE BLD-MCNC: 96 MG/DL (ref 65–99)
INR BLD: 1.04 (ref 0.9–1.1)
PSA SERPL DL<=0.01 NG/ML-MCNC: 14 SECONDS (ref 12.4–14.7)

## 2018-07-26 PROCEDURE — 99232 SBSQ HOSP IP/OBS MODERATE 35: CPT | Performed by: HOSPITALIST

## 2018-07-26 RX ORDER — AMLODIPINE BESYLATE 5 MG/1
5 TABLET ORAL DAILY
Status: DISCONTINUED | OUTPATIENT
Start: 2018-07-26 | End: 2018-07-27

## 2018-07-26 RX ORDER — KETOROLAC TROMETHAMINE 30 MG/ML
30 INJECTION, SOLUTION INTRAMUSCULAR; INTRAVENOUS EVERY 6 HOURS
Status: DISCONTINUED | OUTPATIENT
Start: 2018-07-26 | End: 2018-07-27

## 2018-07-26 NOTE — CONSULTS
Patient: Billy Kennedy  Medical Record Number: [id number]  Referring Physician:  No ref.  provider found  PCP: Reanna Booker MD      HISTORY OF CHIEF COMPLAINT:    Billy Kennedy is a 80year old male, who complains of neck pain   81-year-old male presents Number of children:               Social History Main Topics    Smoking status: Never Smoker                                                                Smokeless tobacco: Never Used                        Alcohol use: No              Drug use:  No Neg   Babinski's Down Down   Clonus Neg Neg   Phalen's - Wrist Neg Neg   Tinel's - Wrist Neg Neg       Strength:  STRENGTH Right  /5 Left  /5   Deltoid 5 5   Biceps 5 5   Triceps 5 5    5 5   Finger ABduction 5 5   Finger Adduction 5 5     HEAD/NECK: H

## 2018-07-26 NOTE — IMAGING NOTE
Discussed patient and imaging findings with Dr. Dana Sanchez. The imaging findings were also reviewed interdepartmentally, and felt to be more consistent with degenerative changes. Osteomyelitis is considered less likely given the multifocal nature and lack of dis

## 2018-07-26 NOTE — PROGRESS NOTES
[facility]  Progress note     Gina [place] Patient Status:  Emergency    1950 MRN [id number]   Location [facility] Attending Tyrone Moreno MD   Hosp Day # 0 PCP Ana Lafleur MD     Chief Complaint: Neck p 32    Quality:  · DVT Prophylaxis: lovenox  · CODE status: full  · Chan: no    Plan of care discussed with family,rn,patient    Lizbeth Diamond MD  [facility]  Internal Medicine Hospitalist  Pager [phone]

## 2018-07-26 NOTE — PLAN OF CARE
Writer received call from Radiologist that MRI of cervical spine is concerning for osteomylitis/discitis. Dr. Michael Uribe notified, order received to consult Dr. Blake Blevins.  Dr. Blake Blevins notified, orders received for CT guided biopsy of cervical spine tomorrow and

## 2018-07-26 NOTE — PROGRESS NOTES
Guinean speaking only, family and patient updated with plan of care, aware that he is npo for CT guided biopsy in am.toradol given for neck pain, no fevers noted. Iv saline locked. walked to bathroom to void.     Dr Vira Krishna aware of consult, updated with MRI r

## 2018-07-26 NOTE — CONSULTS
INFECTIOUS DISEASE CONSULTATION    Ruthy Singleton Patient Status:  Observation    1950 MRN [id number]   [facility] 3SW-A Attending Jonelle Pichardo MD   Hosp Day # 0 PCP Marisabel Mccabe, Hang Mcgraw Son       reports that he has never smoked. He has never used smokeless tobacco. He reports that he does not drink alcohol or use drugs.     Allergies:  No Known Allergies    Medications:    Current Facility-Administered Medications:   •  ondansetro rhonchi. Cardiovascular: S1, S2.  Regular rate and rhythm. No murmurs. Abdomen: Soft, nontender, nondistended. Positive bowel sounds. Musculoskeletal: Full range of motion of all extremities. No swelling noted.   Joints: no effusions  Skin: No lesions

## 2018-07-27 VITALS
SYSTOLIC BLOOD PRESSURE: 155 MMHG | RESPIRATION RATE: 17 BRPM | BODY MASS INDEX: 32.14 KG/M2 | TEMPERATURE: 99 F | HEIGHT: 66 IN | OXYGEN SATURATION: 95 % | DIASTOLIC BLOOD PRESSURE: 58 MMHG | HEART RATE: 54 BPM | WEIGHT: 200 LBS

## 2018-07-27 LAB
BASOPHILS # BLD AUTO: 0.03 X10(3) UL (ref 0–0.1)
BASOPHILS NFR BLD AUTO: 0.6 %
CREAT BLD-MCNC: 1 MG/DL (ref 0.7–1.3)
EOSINOPHIL # BLD AUTO: 0.44 X10(3) UL (ref 0–0.3)
EOSINOPHIL NFR BLD AUTO: 8.4 %
ERYTHROCYTE [DISTWIDTH] IN BLOOD BY AUTOMATED COUNT: 14.7 % (ref 11.5–16)
GLUCOSE BLD-MCNC: 109 MG/DL (ref 65–99)
GLUCOSE BLD-MCNC: 140 MG/DL (ref 65–99)
HCT VFR BLD AUTO: 35.8 % (ref 37–53)
HGB BLD-MCNC: 11.9 G/DL (ref 13–17)
IMMATURE GRANULOCYTE COUNT: 0.01 X10(3) UL (ref 0–1)
IMMATURE GRANULOCYTE RATIO %: 0.2 %
LYMPHOCYTES # BLD AUTO: 1.45 X10(3) UL (ref 0.9–4)
LYMPHOCYTES NFR BLD AUTO: 27.8 %
MCH RBC QN AUTO: 30.4 PG (ref 27–33.2)
MCHC RBC AUTO-ENTMCNC: 33.2 G/DL (ref 31–37)
MCV RBC AUTO: 91.3 FL (ref 80–99)
MONOCYTES # BLD AUTO: 0.52 X10(3) UL (ref 0.1–1)
MONOCYTES NFR BLD AUTO: 10 %
NEUTROPHIL ABS PRELIM: 2.77 X10 (3) UL (ref 1.3–6.7)
NEUTROPHILS # BLD AUTO: 2.77 X10(3) UL (ref 1.3–6.7)
NEUTROPHILS NFR BLD AUTO: 53 %
PLATELET # BLD AUTO: 274 10(3)UL (ref 150–450)
RBC # BLD AUTO: 3.92 X10(6)UL (ref 3.8–5.8)
RED CELL DISTRIBUTION WIDTH-SD: 50.1 FL (ref 35.1–46.3)
WBC # BLD AUTO: 5.2 X10(3) UL (ref 4–13)

## 2018-07-27 PROCEDURE — 99238 HOSP IP/OBS DSCHRG MGMT 30/<: CPT | Performed by: HOSPITALIST

## 2018-07-27 RX ORDER — AMLODIPINE BESYLATE 5 MG/1
5 TABLET ORAL DAILY
Qty: 30 TABLET | Refills: 2 | Status: SHIPPED | OUTPATIENT
Start: 2018-07-28 | End: 2018-11-12

## 2018-07-27 NOTE — PROGRESS NOTES
[facility]                INFECTIOUS DISEASE PROGRESS NOTE    Eryn Ca Patient Status:  Inpatient    1950 MRN [id number]   [facility] 3SW-A Attending Ermelinda Byrd MD   [address] Day # 1 PCP MD Chandni Wheeler without esophagitis     Atelectasis     Nausea     Obesity (BMI 30-39. 9)     Constipation     Hyperglycemia     Intractable back pain     Febrile illness     Neck pain, acute     Fever, unspecified fever cause     Cervical discitis      ASSESSMENT/PLAN:  1

## 2018-07-27 NOTE — PLAN OF CARE
PAIN - ADULT    • Verbalizes/displays adequate comfort level or patient's stated pain goal Progressing        Patient/Family Goals    • Patient/Family Short Term Goal Progressing        SAFETY ADULT - FALL    • Free from fall injury Progressing        Maegan

## 2018-07-30 ENCOUNTER — PATIENT OUTREACH (OUTPATIENT)
Dept: CASE MANAGEMENT | Age: 68
End: 2018-07-30

## 2018-07-30 ENCOUNTER — TELEPHONE (OUTPATIENT)
Dept: PHYSICAL THERAPY | Facility: HOSPITAL | Age: 68
End: 2018-07-30

## 2018-07-30 ENCOUNTER — TELEPHONE (OUTPATIENT)
Dept: INTERNAL MEDICINE CLINIC | Facility: CLINIC | Age: 68
End: 2018-07-30

## 2018-07-30 DIAGNOSIS — M54.2 NECK PAIN, ACUTE: ICD-10-CM

## 2018-07-30 DIAGNOSIS — R50.9 FEVER, UNSPECIFIED FEVER CAUSE: ICD-10-CM

## 2018-07-30 DIAGNOSIS — M54.9 INTRACTABLE BACK PAIN: ICD-10-CM

## 2018-07-30 NOTE — PROGRESS NOTES
Initial Post Discharge Follow Up   Discharge Date: 7/27/18  Contact Date: 7/30/2018    Consent Verification:  Assessment Completed With: Other: Ana Luisa Jernigan patient's daughter Permission received per patient?  written  HIPAA Verified?   Yes    Discharge Dx:   Ele Oliveros Omeprazole 40 MG Oral Capsule Delayed Release Take 1 capsule (40 mg total) by mouth 2 (two) times daily. Disp: 180 capsule Rfl: 1   glipiZIDE 5 MG Oral Tab Take 1 tablet (5 mg total) by mouth 2 (two) times daily before meals.  Disp: 60 tablet Rfl: 3   Met one-on-one appointment with a certified diabetes educator.   An assessment will be performed and will identify the pump candidate's ability to: Perform label reading Identify portion sizes Use insulin to carbohydrate ratio/correction factor accurately Accur Ciara)    Sep 20, 2018 11:15 AM CDT HEMATOLOGY ONCOLOGY FOLLOW UP with Makenzie Raygoza MD [address] [facility] in [place] ([place] [address])        [facility]  [address] DIABETES [address] [place] [address]

## 2018-07-30 NOTE — TELEPHONE ENCOUNTER
Patient was discharged from [facility] on 7/27/18 and is at High risk for readmission and recommended to have a TCM HFU by 8/3/18 or no later that 8/10/18.  [facility] attempted to schedule a TCM HFU appointment patient's daughter Kamila Baron states she will call t

## 2018-08-01 ENCOUNTER — OFFICE VISIT (OUTPATIENT)
Dept: INTERNAL MEDICINE CLINIC | Facility: CLINIC | Age: 68
End: 2018-08-01
Payer: MEDICARE

## 2018-08-01 VITALS
TEMPERATURE: 99 F | DIASTOLIC BLOOD PRESSURE: 70 MMHG | BODY MASS INDEX: 32.34 KG/M2 | WEIGHT: 201.25 LBS | HEIGHT: 66 IN | HEART RATE: 68 BPM | RESPIRATION RATE: 18 BRPM | SYSTOLIC BLOOD PRESSURE: 142 MMHG | OXYGEN SATURATION: 96 %

## 2018-08-01 DIAGNOSIS — Z01.818 PREOPERATIVE EXAMINATION: Primary | ICD-10-CM

## 2018-08-01 DIAGNOSIS — M19.019 AC JOINT ARTHROPATHY: ICD-10-CM

## 2018-08-01 DIAGNOSIS — M75.102 TEAR OF LEFT ROTATOR CUFF, UNSPECIFIED TEAR EXTENT: ICD-10-CM

## 2018-08-01 DIAGNOSIS — I10 BENIGN ESSENTIAL HTN: ICD-10-CM

## 2018-08-01 DIAGNOSIS — E11.9 TYPE 2 DIABETES MELLITUS WITHOUT COMPLICATION, WITHOUT LONG-TERM CURRENT USE OF INSULIN (HCC): ICD-10-CM

## 2018-08-01 DIAGNOSIS — E87.1 HYPONATREMIA: ICD-10-CM

## 2018-08-01 PROCEDURE — 1111F DSCHRG MED/CURRENT MED MERGE: CPT | Performed by: INTERNAL MEDICINE

## 2018-08-01 PROCEDURE — 99214 OFFICE O/P EST MOD 30 MIN: CPT | Performed by: INTERNAL MEDICINE

## 2018-08-01 RX ORDER — FUROSEMIDE 20 MG/1
20 TABLET ORAL DAILY
Qty: 30 TABLET | Refills: 0 | Status: SHIPPED | OUTPATIENT
Start: 2018-08-01 | End: 2019-08-21

## 2018-08-01 NOTE — PROGRESS NOTES
Billy Kennedy is a 80year old male who presents for a pre-operative physical exam and hospital follow up.   Billy Kennedy is scheduled for a left shoulder arthroscopy procedure at [facility] on 8/13/18 performed by Dr Ingrid Hammans, who has request Allergies  PAST HISTORY:     Past Medical History:   Diagnosis Date   • Arthritis    • BPH (benign prostatic hypertrophy)    • Esophageal reflux    • Obesity    • Osteoarthritis    • STROKE     8 or 9 yrs ago per family/ pt denies this 12/13/17   • Stroke Capsule Delayed Release, Take 1 capsule (40 mg total) by mouth 2 (two) times daily. , Disp: 180 capsule, Rfl: 1  •  glipiZIDE 5 MG Oral Tab, Take 1 tablet (5 mg total) by mouth 2 (two) times daily before meals. , Disp: 60 tablet, Rfl: 3  •  MetFORMIN HCl 100 unspecified tear extent/AC joint arthropathy  Chronic issue, worsening symptoms. To have arthroscopic surgery/repair as above. 4.  Benign essential hypertension  Mildly elevated systolic reading today, however should be OK for surgery.   Antihypertens

## 2018-08-01 NOTE — PATIENT INSTRUCTIONS
- Do not take glipizide on the morning of the surgery  - Ok to take other medications on morning of surgery. - Follow up with me in September.     - We will re-start furosemide for next month to make sure sodium stays stable.       It was a pleasure seeing

## 2018-08-03 PROBLEM — R50.9 FEVER, UNSPECIFIED FEVER CAUSE: Status: RESOLVED | Noted: 2018-07-25 | Resolved: 2018-08-03

## 2018-08-07 ENCOUNTER — NURSE ONLY (OUTPATIENT)
Dept: ENDOCRINOLOGY CLINIC | Facility: CLINIC | Age: 68
End: 2018-08-07
Payer: MEDICARE

## 2018-08-07 VITALS
WEIGHT: 199 LBS | SYSTOLIC BLOOD PRESSURE: 156 MMHG | HEART RATE: 86 BPM | BODY MASS INDEX: 32 KG/M2 | DIASTOLIC BLOOD PRESSURE: 86 MMHG

## 2018-08-07 DIAGNOSIS — Z79.4 TYPE 2 DIABETES MELLITUS WITHOUT COMPLICATION, WITH LONG-TERM CURRENT USE OF INSULIN (HCC): Primary | ICD-10-CM

## 2018-08-07 DIAGNOSIS — E11.9 TYPE 2 DIABETES MELLITUS WITHOUT COMPLICATION, WITH LONG-TERM CURRENT USE OF INSULIN (HCC): Primary | ICD-10-CM

## 2018-08-07 PROCEDURE — G0108 DIAB MANAGE TRN  PER INDIV: HCPCS

## 2018-08-07 NOTE — PROGRESS NOTES
Shell Brady  : 1950 was seen for Diabetic Education Follow up:    Date: 2018   Start time: 11:30a End time: 12 pm    Assessment:     Assessment: /86   Pulse 86   Wt 199 lb   BMI 32.12 kg/m²        HEMOGLOBIN A1C (%)   Date Value    Phosphatase 74 45 - 117 U/L   AST 21 15 - 41 U/L   Alt 20 17 - 63 U/L   Bilirubin, Total 0.8 0.1 - 2.0 mg/dL   Total Protein 6.8 6.1 - 8.3 g/dL   Albumin 3.2 (L) 3.5 - 4.8 g/dL   Globulin  3.6 2.5 - 3.7 g/dL   A/G Ratio 0.9 (L) 1.0 - 2.0   Sodium 128 (L) 1 RATE, WESTERGREN (AUTOMATED)   Result Value Ref Range   Sed Rate 15 (H) 0 - 12 mm/Hr   -C-REACTIVE PROTEIN   Result Value Ref Range   C-Reactive Protein 0.73 <1.00 mg/dL   -HEMOGLOBIN A1C   Result Value Ref Range   HgbA1C 6.3 (H) <5.7 %   Estimated Average Result No Growth 5 Days    -RESPIRATORY PANEL FLU EXPANDED   Result Value Ref Range   Adenovirus PCR: Negative Negative   Coronavirus 229E PCR: Negative Negative   Coronavirus Hku1 PCR: Negative Negative   Coronavirus Nl63 PCR: Negative Negative   Coronavi 48. 8 (H) 35.1 - 46.3 fL   Neutrophil Absolute Prelim 12.95 (H) 1.30 - 6.70 x10 (3) uL   Neutrophil Absolute 12.95 (H) 1.30 - 6.70 x10(3) uL   Lymphocyte Absolute 1.39 0.90 - 4.00 x10(3) uL   Monocyte Absolute 0.80 0.10 - 1.00 x10(3) uL   Eosinophil Absolut prior to driving. Goals set for glucose monitoring. Taking Medication  Reviewed medication use, action, timing, and side effects. Injectables: Site selection, rotation, action, timing, and side effects reviewed.  Comments: no changes recommended for t

## 2018-08-08 ENCOUNTER — ANESTHESIA EVENT (OUTPATIENT)
Dept: SURGERY | Facility: HOSPITAL | Age: 68
End: 2018-08-08
Payer: MEDICARE

## 2018-08-08 RX ORDER — ACETAMINOPHEN 500 MG
1000 TABLET ORAL ONCE
Status: CANCELLED | OUTPATIENT
Start: 2018-08-08 | End: 2018-08-08

## 2018-08-08 RX ORDER — SODIUM CHLORIDE, SODIUM LACTATE, POTASSIUM CHLORIDE, CALCIUM CHLORIDE 600; 310; 30; 20 MG/100ML; MG/100ML; MG/100ML; MG/100ML
INJECTION, SOLUTION INTRAVENOUS CONTINUOUS
Status: CANCELLED | OUTPATIENT
Start: 2018-08-08

## 2018-08-08 NOTE — PAT NURSING NOTE
Faxed Sleep apnea risk notification to ,pcp to advise of positive response to sleep apnea risk assessment

## 2018-08-13 ENCOUNTER — ANESTHESIA (OUTPATIENT)
Dept: SURGERY | Facility: HOSPITAL | Age: 68
End: 2018-08-13
Payer: MEDICARE

## 2018-08-13 ENCOUNTER — HOSPITAL ENCOUNTER (OUTPATIENT)
Facility: HOSPITAL | Age: 68
Setting detail: HOSPITAL OUTPATIENT SURGERY
Discharge: HOME | End: 2018-08-13
Attending: ORTHOPAEDIC SURGERY | Admitting: ORTHOPAEDIC SURGERY
Payer: MEDICARE

## 2018-08-13 VITALS
SYSTOLIC BLOOD PRESSURE: 150 MMHG | RESPIRATION RATE: 18 BRPM | HEART RATE: 91 BPM | OXYGEN SATURATION: 94 % | HEIGHT: 66 IN | TEMPERATURE: 98 F | BODY MASS INDEX: 31.18 KG/M2 | WEIGHT: 194 LBS | DIASTOLIC BLOOD PRESSURE: 79 MMHG

## 2018-08-13 LAB
GLUCOSE BLD-MCNC: 109 MG/DL (ref 65–99)
GLUCOSE BLD-MCNC: 161 MG/DL (ref 65–99)
GLUCOSE BLD-MCNC: 171 MG/DL (ref 65–99)

## 2018-08-13 PROCEDURE — 82962 GLUCOSE BLOOD TEST: CPT

## 2018-08-13 PROCEDURE — 0PBB4ZZ EXCISION OF LEFT CLAVICLE, PERCUTANEOUS ENDOSCOPIC APPROACH: ICD-10-PCS | Performed by: ORTHOPAEDIC SURGERY

## 2018-08-13 PROCEDURE — 0LM24ZZ REATTACHMENT OF LEFT SHOULDER TENDON, PERCUTANEOUS ENDOSCOPIC APPROACH: ICD-10-PCS | Performed by: ORTHOPAEDIC SURGERY

## 2018-08-13 PROCEDURE — 76942 ECHO GUIDE FOR BIOPSY: CPT | Performed by: ORTHOPAEDIC SURGERY

## 2018-08-13 PROCEDURE — 3E0T3BZ INTRODUCTION OF ANESTHETIC AGENT INTO PERIPHERAL NERVES AND PLEXI, PERCUTANEOUS APPROACH: ICD-10-PCS | Performed by: ANESTHESIOLOGY

## 2018-08-13 PROCEDURE — 0LN24ZZ RELEASE LEFT SHOULDER TENDON, PERCUTANEOUS ENDOSCOPIC APPROACH: ICD-10-PCS | Performed by: ORTHOPAEDIC SURGERY

## 2018-08-13 PROCEDURE — 0RNK4ZZ RELEASE LEFT SHOULDER JOINT, PERCUTANEOUS ENDOSCOPIC APPROACH: ICD-10-PCS | Performed by: ORTHOPAEDIC SURGERY

## 2018-08-13 DEVICE — ANCHOR SWIVELOCK AR-2324BCC: Type: IMPLANTABLE DEVICE | Site: SHOULDER | Status: FUNCTIONAL

## 2018-08-13 RX ORDER — HYDROCODONE BITARTRATE AND ACETAMINOPHEN 5; 325 MG/1; MG/1
1 TABLET ORAL AS NEEDED
Status: DISCONTINUED | OUTPATIENT
Start: 2018-08-13 | End: 2018-08-13

## 2018-08-13 RX ORDER — BUPIVACAINE HYDROCHLORIDE 5 MG/ML
INJECTION, SOLUTION EPIDURAL; INTRACAUDAL AS NEEDED
Status: DISCONTINUED | OUTPATIENT
Start: 2018-08-13 | End: 2018-08-13 | Stop reason: HOSPADM

## 2018-08-13 RX ORDER — ONDANSETRON 2 MG/ML
4 INJECTION INTRAMUSCULAR; INTRAVENOUS AS NEEDED
Status: DISCONTINUED | OUTPATIENT
Start: 2018-08-13 | End: 2018-08-13

## 2018-08-13 RX ORDER — DEXTROSE MONOHYDRATE 25 G/50ML
50 INJECTION, SOLUTION INTRAVENOUS
Status: DISCONTINUED | OUTPATIENT
Start: 2018-08-13 | End: 2018-08-13 | Stop reason: HOSPADM

## 2018-08-13 RX ORDER — DEXTROSE MONOHYDRATE 25 G/50ML
50 INJECTION, SOLUTION INTRAVENOUS
Status: DISCONTINUED | OUTPATIENT
Start: 2018-08-13 | End: 2018-08-13

## 2018-08-13 RX ORDER — SODIUM CHLORIDE, SODIUM LACTATE, POTASSIUM CHLORIDE, CALCIUM CHLORIDE 600; 310; 30; 20 MG/100ML; MG/100ML; MG/100ML; MG/100ML
INJECTION, SOLUTION INTRAVENOUS CONTINUOUS
Status: DISCONTINUED | OUTPATIENT
Start: 2018-08-13 | End: 2018-08-13

## 2018-08-13 RX ORDER — MEPERIDINE HYDROCHLORIDE 25 MG/ML
12.5 INJECTION INTRAMUSCULAR; INTRAVENOUS; SUBCUTANEOUS AS NEEDED
Status: DISCONTINUED | OUTPATIENT
Start: 2018-08-13 | End: 2018-08-13

## 2018-08-13 RX ORDER — METOCLOPRAMIDE HYDROCHLORIDE 5 MG/ML
10 INJECTION INTRAMUSCULAR; INTRAVENOUS AS NEEDED
Status: DISCONTINUED | OUTPATIENT
Start: 2018-08-13 | End: 2018-08-13

## 2018-08-13 RX ORDER — LABETALOL HYDROCHLORIDE 5 MG/ML
5 INJECTION, SOLUTION INTRAVENOUS EVERY 5 MIN PRN
Status: DISCONTINUED | OUTPATIENT
Start: 2018-08-13 | End: 2018-08-13

## 2018-08-13 RX ORDER — ACETAMINOPHEN 500 MG
1000 TABLET ORAL ONCE AS NEEDED
Status: DISCONTINUED | OUTPATIENT
Start: 2018-08-13 | End: 2018-08-13

## 2018-08-13 RX ORDER — MIDAZOLAM HYDROCHLORIDE 1 MG/ML
1 INJECTION INTRAMUSCULAR; INTRAVENOUS EVERY 5 MIN PRN
Status: DISCONTINUED | OUTPATIENT
Start: 2018-08-13 | End: 2018-08-13

## 2018-08-13 RX ORDER — HYDROCODONE BITARTRATE AND ACETAMINOPHEN 5; 325 MG/1; MG/1
2 TABLET ORAL AS NEEDED
Status: DISCONTINUED | OUTPATIENT
Start: 2018-08-13 | End: 2018-08-13

## 2018-08-13 RX ORDER — CEFAZOLIN SODIUM/WATER 2 G/20 ML
2 SYRINGE (ML) INTRAVENOUS ONCE
Status: COMPLETED | OUTPATIENT
Start: 2018-08-13 | End: 2018-08-13

## 2018-08-13 RX ORDER — MORPHINE SULFATE 4 MG/ML
2 INJECTION, SOLUTION INTRAMUSCULAR; INTRAVENOUS EVERY 5 MIN PRN
Status: DISCONTINUED | OUTPATIENT
Start: 2018-08-13 | End: 2018-08-13

## 2018-08-13 RX ORDER — INSULIN ASPART 100 [IU]/ML
INJECTION, SOLUTION INTRAVENOUS; SUBCUTANEOUS ONCE
Status: COMPLETED | OUTPATIENT
Start: 2018-08-13 | End: 2018-08-13

## 2018-08-13 RX ORDER — INSULIN ASPART 100 [IU]/ML
INJECTION, SOLUTION INTRAVENOUS; SUBCUTANEOUS
Status: COMPLETED
Start: 2018-08-13 | End: 2018-08-13

## 2018-08-13 RX ORDER — ACETAMINOPHEN 500 MG
1000 TABLET ORAL ONCE
Status: ON HOLD | COMMUNITY
End: 2018-08-13

## 2018-08-13 RX ORDER — NALOXONE HYDROCHLORIDE 0.4 MG/ML
80 INJECTION, SOLUTION INTRAMUSCULAR; INTRAVENOUS; SUBCUTANEOUS AS NEEDED
Status: DISCONTINUED | OUTPATIENT
Start: 2018-08-13 | End: 2018-08-13

## 2018-08-13 RX ORDER — DEXAMETHASONE SODIUM PHOSPHATE 4 MG/ML
4 VIAL (ML) INJECTION AS NEEDED
Status: DISCONTINUED | OUTPATIENT
Start: 2018-08-13 | End: 2018-08-13

## 2018-08-13 RX ORDER — ACETAMINOPHEN 500 MG
1000 TABLET ORAL ONCE
Status: DISCONTINUED | OUTPATIENT
Start: 2018-08-13 | End: 2018-08-13 | Stop reason: HOSPADM

## 2018-08-13 NOTE — INTERVAL H&P NOTE
Pre-op Diagnosis: LEFT SHOULDER TEAR, IMPINGEMENT, ROTATOR CUFF (?)TEAR    The above referenced H&P was reviewed by Erich James MD on 8/13/2018, the patient was examined and no significant changes have occurred in the patient's condition since the

## 2018-08-13 NOTE — ANESTHESIA PREPROCEDURE EVALUATION
PRE-OP EVALUATION    Patient Name: Mohan Gleason    Pre-op Diagnosis: LEFT SHOULDER TEAR, IMPINGEMENT, ROTATOR CUFF (?)TEAR    Procedure(s):  LEFT SHOULDER ARTHROSCOPY, WITH ROTATOR CUFF REPAIR, SUBACROMIAL DECOMPRESSION, DISTAL CLAVICLE RESECTION, POSSIBL Oral Tab EC Take 325 mg by mouth daily with breakfast. Disp:  Rfl:        Allergies: Patient has no known allergies. Anesthesia Evaluation    Patient summary reviewed.     Anesthetic Complications  (+) history of anesthetic complications  History of: P 8.8 07/24/2018       Lab Results  Component Value Date   INR 1.04 07/26/2018         Airway      Mallampati: II  Mouth opening: >3 FB  TM distance: > 6 cm  Neck ROM: full Cardiovascular      Rhythm: regular  Rate: normal     Dental             Pulmonary

## 2018-08-13 NOTE — BRIEF OP NOTE
Pre-Operative Diagnosis: LEFT SHOULDER ROTATOR CUFF TEAR, IMPINGEMENT SYNDROME AND AC JOINT ARTHROSIS      Post-Operative Diagnosis: LEFT SHOULDER ROTATOR CUFF TEAR, IMPINGEMENT SYNDROME, AC JOINT ARTHROSIS AND BICEPS TENDON TEAR      Procedure Performed:

## 2018-08-13 NOTE — H&P (VIEW-ONLY)
Patient: Jesús Rockwell  Medical Record Number: [id number]  Referring Physician:  No ref.  provider found  PCP: Bravo Liang MD      HISTORY OF CHIEF COMPLAINT:    Jesús Rockwell is a 80year old male, who complains of neck pain   52-year-old male presents Number of children:               Social History Main Topics    Smoking status: Never Smoker                                                                Smokeless tobacco: Never Used                        Alcohol use: No              Drug use:  No Neg   Babinski's Down Down   Clonus Neg Neg   Phalen's - Wrist Neg Neg   Tinel's - Wrist Neg Neg       Strength:  STRENGTH Right  /5 Left  /5   Deltoid 5 5   Biceps 5 5   Triceps 5 5    5 5   Finger ABduction 5 5   Finger Adduction 5 5     HEAD/NECK: H

## 2018-08-13 NOTE — OPERATIVE REPORT
[facility]    PATIENT'S NAME: Brocktonkirt Nino   ATTENDING PHYSICIAN: Naseem Garcia M.D. OPERATING PHYSICIAN: Naseem Garcia M.D.    PATIENT ACCOUNT#:   [de-identified]    LOCATION:  PACU 1404 [facility] PACU 3 EDWP 10  MEDICAL RECORD #:   [id number]       DATE OF to be markedly frayed extending into the intertubercular groove. The subscapularis tendon was intact. The supraspinatus and infraspinatus tendons showed a full-thickness tear.   The labrum demonstrated some mild degenerative changes, particularly of the a suture from the posterior anchor was removed, it was not needed for the repair.   The remaining suture was then passed through the cuff using the scorpion suture passer and then Jacobson knots and reverse half hitches were tied with traction to the adjacent u

## 2018-08-13 NOTE — ANESTHESIA POSTPROCEDURE EVALUATION
[address] [place] [address] Patient Status:  Hospital Outpatient Surgery   Age/Gender 80year old male MRN [id number]   Location [facility] Attending Eileen Napier MD   Hosp Day # 0 PCP MD Franki Maurer

## 2018-09-04 ENCOUNTER — TELEPHONE (OUTPATIENT)
Dept: INTERNAL MEDICINE CLINIC | Facility: CLINIC | Age: 68
End: 2018-09-04

## 2018-09-04 DIAGNOSIS — E87.1 HYPONATREMIA: Primary | ICD-10-CM

## 2018-09-04 NOTE — TELEPHONE ENCOUNTER
Ok to discontinue furosemide. Should get repeat metabolic panel done in 1 month to monitor sodium levels. Order placed. Non-fasting.

## 2018-09-11 ENCOUNTER — HOSPITAL ENCOUNTER (OUTPATIENT)
Dept: PHYSICAL THERAPY | Facility: HOSPITAL | Age: 68
Setting detail: THERAPIES SERIES
Discharge: HOME | End: 2018-09-11
Attending: INTERNAL MEDICINE
Payer: MEDICARE

## 2018-09-11 DIAGNOSIS — Z98.890 S/P LEFT ROTATOR CUFF REPAIR: ICD-10-CM

## 2018-09-11 PROCEDURE — 97162 PT EVAL MOD COMPLEX 30 MIN: CPT

## 2018-09-11 PROCEDURE — 97110 THERAPEUTIC EXERCISES: CPT

## 2018-09-11 NOTE — PROGRESS NOTES
UPPER EXTREMITY EVALUATION:   Referring Physician: Dr. Chiara Montero. Diagnosis: s/p left rtc repair, SAD/DCR, long head biceps tenotomy.      Date of Service: 9/11/2018     PATIENT SUMMARY   Eryn Ca is a 80year old y/o male who presents to therapy toda instructed in and issued a HEP for: codman's exercise, active left wrist/hand exercises, yellow putty use left hand, and seated scapular retractions. Charges: PT Eval Moderate Complexity, TherEx1.       Total Timed Treatment: 20 min     Total Treatment Ti

## 2018-09-14 ENCOUNTER — HOSPITAL ENCOUNTER (OUTPATIENT)
Dept: PHYSICAL THERAPY | Facility: HOSPITAL | Age: 68
Setting detail: THERAPIES SERIES
Discharge: HOME | End: 2018-09-14
Attending: INTERNAL MEDICINE
Payer: MEDICARE

## 2018-09-14 PROCEDURE — 97110 THERAPEUTIC EXERCISES: CPT

## 2018-09-14 PROCEDURE — 97140 MANUAL THERAPY 1/> REGIONS: CPT

## 2018-09-14 NOTE — PROGRESS NOTES
Dx: s/p left rtc repair, sad/dcr, biceps tenotomy. # of Visits:  10         Next MD visit: 9/25/18. Fall Risk: standard         Precautions: n/a             Subjective: No pain noted left shoulder currently.    Is not taking any pain meds or NSAID

## 2018-09-16 ENCOUNTER — HOSPITAL ENCOUNTER (EMERGENCY)
Facility: HOSPITAL | Age: 68
Discharge: HOME | End: 2018-09-16
Attending: EMERGENCY MEDICINE
Payer: MEDICARE

## 2018-09-16 ENCOUNTER — APPOINTMENT (OUTPATIENT)
Dept: GENERAL RADIOLOGY | Facility: HOSPITAL | Age: 68
End: 2018-09-16
Attending: EMERGENCY MEDICINE
Payer: MEDICARE

## 2018-09-16 VITALS
SYSTOLIC BLOOD PRESSURE: 135 MMHG | TEMPERATURE: 99 F | OXYGEN SATURATION: 93 % | HEART RATE: 63 BPM | RESPIRATION RATE: 18 BRPM | BODY MASS INDEX: 31.18 KG/M2 | WEIGHT: 194 LBS | DIASTOLIC BLOOD PRESSURE: 78 MMHG | HEIGHT: 66 IN

## 2018-09-16 DIAGNOSIS — M10.9 ACUTE GOUT OF LEFT WRIST, UNSPECIFIED CAUSE: Primary | ICD-10-CM

## 2018-09-16 PROCEDURE — 99283 EMERGENCY DEPT VISIT LOW MDM: CPT

## 2018-09-16 PROCEDURE — 73110 X-RAY EXAM OF WRIST: CPT | Performed by: EMERGENCY MEDICINE

## 2018-09-16 RX ORDER — PREDNISONE 20 MG/1
40 TABLET ORAL DAILY
Qty: 10 TABLET | Refills: 0 | Status: SHIPPED | OUTPATIENT
Start: 2018-09-16 | End: 2018-09-21

## 2018-09-16 RX ORDER — HYDROCODONE BITARTRATE AND ACETAMINOPHEN 5; 325 MG/1; MG/1
1-2 TABLET ORAL EVERY 4 HOURS PRN
Qty: 20 TABLET | Refills: 0 | Status: SHIPPED | OUTPATIENT
Start: 2018-09-16 | End: 2018-09-23

## 2018-09-16 NOTE — ED PROVIDER NOTES
Patient Seen in: [facility] Emergency Department    History   Patient presents with:  Pain (neurologic)    Stated Complaint: pain to left hand, shoulder surgery 1 month ago.      HPI    60-year-old male presents emergency department complaining of le POLYPECTOMY [id number]; N/A      Comment:  Performed by Cas Segal MD at [address] [place] [address],[address]  12/30/2013: GASTROSCOPY; N/A      Comment:  Performed by Nolan Gutierrez MD at [facility] ENDOSCOPY  12/22/2017: KNEE TOTAL REPLACEMENT; Ri bilaterally no crackles no wheezes no accessory muscle use  Abdomen: Soft nontender nondistended, no rebound no guarding  no hepatosplenomegaly bowel sounds are present , no pulsatile mass  Extremities: No clubbing cyanosis or edema 2+ distal pulses.   Maegan pm    Follow-up:  Syeda Charles MD  [address] [place] [address]  [phone]              Medications Prescribed:  Current Discharge Medication List    START taking these medications    predniSONE 20 MG Oral Tab  Take 2 tablets (40 mg total) b

## 2018-09-16 NOTE — ED INITIAL ASSESSMENT (HPI)
Pt here for left hand pain that started last night. Redness and slight swelling noted. Pt had recent left shoulder surgery. Pt states pain with movement or touch. Pt denies any recent injury to area.

## 2018-09-18 ENCOUNTER — HOSPITAL ENCOUNTER (OUTPATIENT)
Dept: PHYSICAL THERAPY | Facility: HOSPITAL | Age: 68
Setting detail: THERAPIES SERIES
Discharge: HOME | End: 2018-09-18
Attending: INTERNAL MEDICINE
Payer: MEDICARE

## 2018-09-18 PROCEDURE — 97140 MANUAL THERAPY 1/> REGIONS: CPT

## 2018-09-18 PROCEDURE — 97110 THERAPEUTIC EXERCISES: CPT

## 2018-09-18 NOTE — PROGRESS NOTES
Dx: s/p left rtc repair, sad/dcr, biceps tenotomy. # of Visits:  10         Next MD visit: 9/25/18. Fall Risk: standard         Precautions: n/a             Subjective: No pain noted left shoulder currently.    Reports that he went to the ER a few left shoulder area by PT while supine, sitting, and right side lying. .         Grade 2 left a/p GH joint by PT. Grade 2 left GH a/p mobs by PT. Christenman's exercise 2 x 10 each direction left shoulder with cueing prn. ..                  Skilled Yolanda Jc

## 2018-09-20 ENCOUNTER — OFFICE VISIT (OUTPATIENT)
Dept: HEMATOLOGY/ONCOLOGY | Facility: HOSPITAL | Age: 68
End: 2018-09-20
Attending: INTERNAL MEDICINE
Payer: MEDICARE

## 2018-09-20 ENCOUNTER — TELEPHONE (OUTPATIENT)
Dept: HEMATOLOGY/ONCOLOGY | Facility: HOSPITAL | Age: 68
End: 2018-09-20

## 2018-09-20 VITALS
OXYGEN SATURATION: 97 % | BODY MASS INDEX: 31.79 KG/M2 | SYSTOLIC BLOOD PRESSURE: 159 MMHG | WEIGHT: 197.81 LBS | DIASTOLIC BLOOD PRESSURE: 75 MMHG | TEMPERATURE: 99 F | HEART RATE: 83 BPM | RESPIRATION RATE: 18 BRPM | HEIGHT: 65.98 IN

## 2018-09-20 DIAGNOSIS — D50.0 IRON DEFICIENCY ANEMIA DUE TO CHRONIC BLOOD LOSS: ICD-10-CM

## 2018-09-20 DIAGNOSIS — D75.839 THROMBOCYTOSIS: ICD-10-CM

## 2018-09-20 DIAGNOSIS — D64.9 NORMOCYTIC ANEMIA: ICD-10-CM

## 2018-09-20 PROCEDURE — 99213 OFFICE O/P EST LOW 20 MIN: CPT | Performed by: INTERNAL MEDICINE

## 2018-09-20 NOTE — PROGRESS NOTES
[facility] Progress Note    Patient Name: Yong Mccann   YOB: 1950   Medical Record Number: [id number]   CSN: [id number]   Attending Physician: Kenia Preston M.D.    Referring Physician: Jeana Flores MD      Date of Visit: 9/20/2018 Medical History:  No date: Arthritis  No date: Back problem      Comment:  lumbar spine pain  No date: BPH (benign prostatic hypertrophy)  No date: Esophageal reflux  No date: High blood pressure  No date: Obesity  No date: Osteoarthritis  No date: PONV (p Sandie Corrigan MD at [address] OR  1/16= H pyloru negative, esophageal benign biopsioes: UPPER GI   ENDOSCOPY PERFORMED  4/25/2016: UPPER GI ENDOSCOPY,BIOPSY; N/A      Comment:   Erosive esophagitis (no EoE/malignancy), schatzki's                ring, hiatal her lymphadenopathy. Neck is supple. Lymphatics: There is no palpable lymphadenopathy   Chest: Clear to auscultation. Heart: Regular rate and rhythm. Abdomen: Soft, non tender with good bowel sounds. No hepatosplenomegaly. No palpable mass.   Extremities: well-being and any concerns about anxiety or depression. We discussed issues of distress, coping difficulties and social support systems and currently there are no active problems.     Cora Braun MD  [facility]

## 2018-09-21 ENCOUNTER — HOSPITAL ENCOUNTER (OUTPATIENT)
Dept: PHYSICAL THERAPY | Facility: HOSPITAL | Age: 68
Setting detail: THERAPIES SERIES
Discharge: HOME | End: 2018-09-21
Attending: INTERNAL MEDICINE
Payer: MEDICARE

## 2018-09-21 PROCEDURE — 97140 MANUAL THERAPY 1/> REGIONS: CPT

## 2018-09-21 PROCEDURE — 97110 THERAPEUTIC EXERCISES: CPT

## 2018-09-21 NOTE — PROGRESS NOTES
Dx: s/p left rtc repair, sad/dcr, biceps tenotomy. # of Visits:  10         Next MD visit:  9/25/18. Fall Risk: standard         Precautions: n/a             Subjective: No pain noted left shoulder currently.    Reports that he fell 2 days ago and Supine and right side lying PROM left shoulder by PT. Supine and right side lying PROM left shoulder by PT.        stm left shoulder area by PT.  stm left shoulder area by PT while supine, sitting, and right side lying.  .  stm left shoulder by PT while conner

## 2018-09-28 ENCOUNTER — TELEPHONE (OUTPATIENT)
Dept: INTERNAL MEDICINE CLINIC | Facility: CLINIC | Age: 68
End: 2018-09-28

## 2018-09-28 NOTE — TELEPHONE ENCOUNTER
Patient called requesting refill for:  MetFORMIN HCl 1000 MG Oral Tab    [address] - [address] [place], [place] - [address], [phone]

## 2018-10-02 ENCOUNTER — HOSPITAL ENCOUNTER (OUTPATIENT)
Dept: PHYSICAL THERAPY | Facility: HOSPITAL | Age: 68
Setting detail: THERAPIES SERIES
Discharge: HOME | End: 2018-10-02
Attending: INTERNAL MEDICINE
Payer: MEDICARE

## 2018-10-02 PROCEDURE — 97110 THERAPEUTIC EXERCISES: CPT

## 2018-10-02 PROCEDURE — 97140 MANUAL THERAPY 1/> REGIONS: CPT

## 2018-10-02 NOTE — PROGRESS NOTES
Dx: s/p left rtc repair, sad/dcr, biceps tenotomy. # of Visits:  10         Next MD visit:  9/25/18. Fall Risk: standard         Precautions: n/a             Subjective: No pain noted left shoulder currently.    Reports that he is having some left tolerated. HEP; encouraged to do each day. HEP. PROM left shoulder while supine and while right side lying. Supine and right side lying PROM left shoulder by PT.   Supine and right side lying PROM left shoulder by PT.  AA/PROM left shoulder while

## 2018-10-05 ENCOUNTER — HOSPITAL ENCOUNTER (OUTPATIENT)
Dept: PHYSICAL THERAPY | Facility: HOSPITAL | Age: 68
Setting detail: THERAPIES SERIES
Discharge: HOME | End: 2018-10-05
Attending: INTERNAL MEDICINE
Payer: MEDICARE

## 2018-10-05 PROCEDURE — 97140 MANUAL THERAPY 1/> REGIONS: CPT

## 2018-10-05 PROCEDURE — 97110 THERAPEUTIC EXERCISES: CPT

## 2018-10-05 NOTE — PROGRESS NOTES
Dx: s/p left rtc repair, sad/dcr, biceps tenotomy. # of Visits:  10         Next MD visit:  9/25/18. Fall Risk: standard         Precautions: n/a             Subjective: 1-2/10 pain level noted left shoulder currently.      Objective: see treatmen supine and while right side lying. X.     stm left shoulder area by PT.  stm left shoulder area by PT while supine, sitting, and right side lying. .  stm left shoulder by PT while supine and while right side lying. X. stm left shoulder area.       Grade 2

## 2018-10-08 ENCOUNTER — OFFICE VISIT (OUTPATIENT)
Dept: INTERNAL MEDICINE CLINIC | Facility: CLINIC | Age: 68
End: 2018-10-08
Payer: MEDICARE

## 2018-10-08 ENCOUNTER — HOSPITAL ENCOUNTER (OUTPATIENT)
Dept: GENERAL RADIOLOGY | Age: 68
Discharge: HOME | End: 2018-10-08
Attending: INTERNAL MEDICINE
Payer: MEDICARE

## 2018-10-08 VITALS
SYSTOLIC BLOOD PRESSURE: 152 MMHG | TEMPERATURE: 98 F | DIASTOLIC BLOOD PRESSURE: 80 MMHG | BODY MASS INDEX: 32 KG/M2 | WEIGHT: 198 LBS | HEART RATE: 76 BPM

## 2018-10-08 DIAGNOSIS — M10.9 ACUTE GOUT OF LEFT HAND, UNSPECIFIED CAUSE: Primary | ICD-10-CM

## 2018-10-08 DIAGNOSIS — R07.89 RIGHT-SIDED CHEST WALL PAIN: ICD-10-CM

## 2018-10-08 DIAGNOSIS — I10 BENIGN ESSENTIAL HTN: ICD-10-CM

## 2018-10-08 DIAGNOSIS — E87.1 HYPONATREMIA: ICD-10-CM

## 2018-10-08 PROCEDURE — 71101 X-RAY EXAM UNILAT RIBS/CHEST: CPT | Performed by: INTERNAL MEDICINE

## 2018-10-08 PROCEDURE — 99214 OFFICE O/P EST MOD 30 MIN: CPT | Performed by: INTERNAL MEDICINE

## 2018-10-08 RX ORDER — TRAMADOL HYDROCHLORIDE 50 MG/1
50 TABLET ORAL EVERY 8 HOURS PRN
Qty: 45 TABLET | Refills: 1 | Status: SHIPPED | OUTPATIENT
Start: 2018-10-08 | End: 2019-08-21

## 2018-10-08 RX ORDER — SODIUM CHLORIDE 1 G/1
TABLET ORAL
Qty: 90 TABLET | Refills: 0 | OUTPATIENT
Start: 2018-10-08

## 2018-10-08 NOTE — PROGRESS NOTES
Emmett Ortiz is a 80year old male. HPI:   Patient presents with:  ER F/U: Tray-9/16/2018- left hand pain  Patient presents for follow up on left wrist pain. Kyrgyz speaking, family members in room to translate.   He initially presented to the [address] [place] [address] Release, Take 1 capsule (40 mg total) by mouth 2 (two) times daily. , Disp: 180 capsule, Rfl: 1  •  glipiZIDE 5 MG Oral Tab, Take 1 tablet (5 mg total) by mouth 2 (two) times daily before meals. , Disp: 60 tablet, Rfl: 3  •  MetFORMIN HCl 1000 MG Oral Tab, T (36.7 °C) (Oral)   Wt 198 lb   BMI 31.97 kg/m²   GENERAL: Alert and oriented, well developed, well nourished,in no apparent distress  HEENT: atraumatic, PERRLA, EOMI, normal lid and conjunctiva  LUNGS: clear to auscultation bilaterally, no wheezing/rubs  C

## 2018-10-08 NOTE — PATIENT INSTRUCTIONS
- Get x-ray done today  - Take Tramadol as needed for pain  - Do home therapy exercises   - Get blood work done when you can. - Follow up in 2 months for blood pressure. It was a pleasure seeing you in the clinic today.   Thank you for choosing the [facility]

## 2018-10-09 ENCOUNTER — HOSPITAL ENCOUNTER (OUTPATIENT)
Dept: PHYSICAL THERAPY | Facility: HOSPITAL | Age: 68
Setting detail: THERAPIES SERIES
Discharge: HOME | End: 2018-10-09
Attending: INTERNAL MEDICINE
Payer: MEDICARE

## 2018-10-09 PROCEDURE — 97140 MANUAL THERAPY 1/> REGIONS: CPT

## 2018-10-09 PROCEDURE — 97110 THERAPEUTIC EXERCISES: CPT

## 2018-10-09 NOTE — PROGRESS NOTES
Dx: s/p left rtc repair, sad/dcr, biceps tenotomy. # of Visits:  10 initially. Next MD visit:  Tbd. .. Fall Risk: standard         Precautions: n/a             Subjective: 2/10 pain level noted left shoulder currently.    Notes that his right shoulder flexion while holding SB with 2 hands x 10 reps. Ramiro Vidal PROM left shoulder while supine and while right side lying. Supine and right side lying PROM left shoulder by PT.   Supine and right side lying PROM left shoulder by PT.  AA/PROM left shoulder

## 2018-10-10 RX ORDER — SODIUM CHLORIDE 1 G/1
TABLET ORAL
Qty: 90 TABLET | Refills: 0 | OUTPATIENT
Start: 2018-10-10

## 2018-10-11 ENCOUNTER — TELEPHONE (OUTPATIENT)
Dept: INTERNAL MEDICINE CLINIC | Facility: CLINIC | Age: 68
End: 2018-10-11

## 2018-10-12 ENCOUNTER — HOSPITAL ENCOUNTER (OUTPATIENT)
Dept: PHYSICAL THERAPY | Facility: HOSPITAL | Age: 68
Setting detail: THERAPIES SERIES
Discharge: HOME | End: 2018-10-12
Attending: INTERNAL MEDICINE
Payer: MEDICARE

## 2018-10-12 PROCEDURE — 97140 MANUAL THERAPY 1/> REGIONS: CPT

## 2018-10-12 PROCEDURE — 97110 THERAPEUTIC EXERCISES: CPT

## 2018-10-12 NOTE — PROGRESS NOTES
Dx: s/p left rtc repair, sad/dcr, biceps tenotomy. # of Visits:  10 initially. Next MD visit:  Tbd. .. Fall Risk: standard         Precautions: n/a             Subjective: 2/10 pain level noted left shoulder currently.    States that he has be with cueing prn. .     Left wrist/hand AROM 2 x 10 each. - X 20 each movement. X 20 each. X 20 each. - Left ER right side lying 0# 2 x 10. Yellow putty squeezing with left hand as HEP. HEP as tolerated. HEP; encouraged to do each day. HEP.   HEP [id number].        Total Timed Treatment: 45 min  Total Treatment Time: 45 min

## 2018-10-17 ENCOUNTER — APPOINTMENT (OUTPATIENT)
Dept: LAB | Age: 68
End: 2018-10-17
Attending: INTERNAL MEDICINE
Payer: MEDICARE

## 2018-10-17 DIAGNOSIS — E87.1 HYPONATREMIA: ICD-10-CM

## 2018-10-17 DIAGNOSIS — M10.9 ACUTE GOUT OF LEFT HAND, UNSPECIFIED CAUSE: ICD-10-CM

## 2018-10-17 PROCEDURE — 80048 BASIC METABOLIC PNL TOTAL CA: CPT

## 2018-10-17 PROCEDURE — 84550 ASSAY OF BLOOD/URIC ACID: CPT

## 2018-10-18 ENCOUNTER — HOSPITAL ENCOUNTER (OUTPATIENT)
Dept: PHYSICAL THERAPY | Facility: HOSPITAL | Age: 68
Setting detail: THERAPIES SERIES
Discharge: HOME | End: 2018-10-18
Attending: INTERNAL MEDICINE
Payer: MEDICARE

## 2018-10-18 PROCEDURE — 97110 THERAPEUTIC EXERCISES: CPT

## 2018-10-18 NOTE — PROGRESS NOTES
Dx: s/p left rtc repair, sad/dcr, biceps tenotomy. # of Visits:  10 initially. Next MD visit:  Tbd. .. Fall Risk: standard         Precautions: n/a             Subjective: No concerns voiced    Objective: see treatment flow sheet.    Patient's with 2 hands x 10 reps. . Supine SF while holding a SB with 2 hands 2 x 10.  cont'd- short arc flex/ext    PROM left shoulder while supine and while right side lying. Supine and right side lying PROM left shoulder by PT.   Supine and right side lying PROM l min

## 2018-10-24 ENCOUNTER — HOSPITAL ENCOUNTER (OUTPATIENT)
Dept: PHYSICAL THERAPY | Facility: HOSPITAL | Age: 68
Setting detail: THERAPIES SERIES
Discharge: HOME | End: 2018-10-24
Attending: INTERNAL MEDICINE
Payer: MEDICARE

## 2018-10-24 PROCEDURE — 97140 MANUAL THERAPY 1/> REGIONS: CPT

## 2018-10-24 PROCEDURE — 97110 THERAPEUTIC EXERCISES: CPT

## 2018-10-25 NOTE — PROGRESS NOTES
Dx: s/p left rtc repair, sad/dcr, biceps tenotomy. # of Visits:  10 initially. Next MD visit:  Tbd. .. Fall Risk: standard         Precautions: n/a            Progress Summary    Pt has attended 10 visits in Physical Therapy to date.     Kathryn Rosenbaum therapy, education, and strengthening per protocol and as tolerated. Thank you for your referral.   Please co-sign or sign and return this letter via fax as soon as possible to [phone].    If you have any questions, please contact me at Dept: [phone] cont'd- short arc flex/ext 2 x 10 in ROM as able to be controlled. PROM left shoulder while supine and while right side lying. Supine and right side lying PROM left shoulder by PT.   Supine and right side lying PROM left shoulder by PT.  AA/PROM left sh in supine    cpk in sitting x 10 min R/S at 90 degrees of SF for 1 minute. Skilled Services: manual therapy, education. Charges:  TherEx3     Total Timed Treatment: 40min  Total Treatment Time: 50 min

## 2018-10-26 ENCOUNTER — HOSPITAL ENCOUNTER (OUTPATIENT)
Dept: PHYSICAL THERAPY | Facility: HOSPITAL | Age: 68
Setting detail: THERAPIES SERIES
Discharge: HOME | End: 2018-10-26
Attending: INTERNAL MEDICINE
Payer: MEDICARE

## 2018-10-26 PROCEDURE — 97110 THERAPEUTIC EXERCISES: CPT

## 2018-10-26 NOTE — PROGRESS NOTES
Dx: s/p left rtc repair, sad/dcr, biceps tenotomy. # of Visits:  10 initially. Next MD visit:  Tbd. .. Fall Risk: standard         Precautions: n/a            Subjective: Patient reports that left shoulder pain is rated 3/10 this morning. SR 2 x 10 with cueing. 2 x 10 with cueing to improve quality of the movement. 2 x 10.  2 x 10.  3 x 10.  3 x 10 and as HEP. Seated SR x 20 with cueing prn. . cont'd w/ verbal and tactile cues    Scapular retraction in sdly w/ tactile cues SR with red ba by PT.  Cont;d Grade 3 left GH a/p and caudal mobs by PT. Grade 3 left GH a/p and caudal mobs by PT. Codman's exercise 2 x 10 each direction left shoulder with cueing prn. .. Painful right ribs this afternoon when attempting to do.   Supine left AA IR/E

## 2018-10-31 ENCOUNTER — HOSPITAL ENCOUNTER (OUTPATIENT)
Dept: PHYSICAL THERAPY | Facility: HOSPITAL | Age: 68
Setting detail: THERAPIES SERIES
Discharge: HOME | End: 2018-10-31
Attending: INTERNAL MEDICINE
Payer: MEDICARE

## 2018-10-31 PROCEDURE — 97110 THERAPEUTIC EXERCISES: CPT

## 2018-10-31 NOTE — PROGRESS NOTES
Dx: s/p left rtc repair, sad/dcr, biceps tenotomy. # of Visits:  10 additional... Next MD visit:  Tbd. ..   Fall Risk: standard         Precautions: n/a            Subjective: Patient reports that left shoulder pain is rated 1-2/10 this afterno Cervical AROM x 10 each direction. SR 2 x 10 with cueing. 2 x 10 with cueing to improve quality of the movement. 2 x 10.  2 x 10.  3 x 10.  3 x 10 and as HEP. Seated SR x 20 with cueing prn. . cont'd w/ verbal and tactile cues    Scapular retraction in area including rtc mm, pectoralis mm. .. Grade 2 left a/p GH joint by PT. Grade 2 left GH a/p mobs by PT. Grade 2 left GH a/p mobs by PT. X. Grade 3 left GH a/p mobs by PT. Grade 3 left GH a/p and p/a mobs by PT.  Grade 3 left GH a/p and p/a mobs by P

## 2018-11-08 ENCOUNTER — HOSPITAL ENCOUNTER (OUTPATIENT)
Dept: PHYSICAL THERAPY | Facility: HOSPITAL | Age: 68
Setting detail: THERAPIES SERIES
Discharge: HOME | End: 2018-11-08
Attending: INTERNAL MEDICINE
Payer: MEDICARE

## 2018-11-08 PROCEDURE — 97110 THERAPEUTIC EXERCISES: CPT

## 2018-11-08 NOTE — PROGRESS NOTES
Dx: s/p left rtc repair, sad/dcr, biceps tenotomy. # of Visits:  10 additional... Next MD visit:  Tbd. .. Fall Risk: standard         Precautions: n/a            Subjective: Patient reports that left shoulder pain is rated 2/10 this morning. SR 2 x 10 with cueing. 2 x 10 with cueing to improve quality of the movement. 2 x 10.  2 x 10.  3 x 10.  3 x 10 and as HEP. Seated SR x 20 with cueing prn. . cont'd w/ verbal and tactile cues    Scapular retraction in sdly w/ tactile cues SR with red b posterior shoulder and upper trap L stm left posterior shoulder and UT m. .. X. stm left shoulder area including rtc mm, pectoralis mm. ..   stm left shoulder area as noted before. Grade 2 left a/p GH joint by PT. Grade 2 left GH a/p mobs by PT.   Grade 2 therapy, education. Charges:  TherEx3     Total Timed Treatment: 45min  Total Treatment Time: 45 min

## 2018-11-12 ENCOUNTER — HOSPITAL ENCOUNTER (OUTPATIENT)
Dept: PHYSICAL THERAPY | Facility: HOSPITAL | Age: 68
Setting detail: THERAPIES SERIES
Discharge: HOME | End: 2018-11-12
Attending: INTERNAL MEDICINE
Payer: MEDICARE

## 2018-11-12 PROCEDURE — 97110 THERAPEUTIC EXERCISES: CPT

## 2018-11-12 RX ORDER — GLIPIZIDE 5 MG/1
5 TABLET ORAL
Qty: 60 TABLET | Refills: 3 | Status: SHIPPED | OUTPATIENT
Start: 2018-11-12 | End: 2018-12-05

## 2018-11-12 RX ORDER — AMLODIPINE BESYLATE 5 MG/1
5 TABLET ORAL DAILY
Qty: 30 TABLET | Refills: 2 | Status: SHIPPED | OUTPATIENT
Start: 2018-11-12 | End: 2018-12-05

## 2018-11-12 NOTE — PROGRESS NOTES
Dx: s/p left rtc repair, sad/dcr, biceps tenotomy. # of Visits:  10 additional... Next MD visit:  Tbd. ..   Fall Risk: standard         Precautions: n/a            Subjective: Patient reports that left shoulder pain is rated 2-3/10 this morning direction. Cervical AROM x 10 each direction. X 15 EACH. For 15 reps each direction. SR 2 x 10 with cueing. 2 x 10 with cueing to improve quality of the movement. 2 x 10.  2 x 10.  3 x 10.  3 x 10 and as HEP. Seated SR x 20 with cueing andrew. Lauren Merino supine and while right side lying. X. stm left shoulder area. stm left shoulder area by PT.  stm left shoulder area including pectoralis mm, scalenes. .. STM posterior shoulder and upper trap L stm left posterior shoulder and UT m. .. X. stm left shoulder HA 0# x 10 each while right side lying. 0# x 10 each with cueing prnn X 15 each. Standing sliding pillow case up wall x 10 each arm with cueing prn. . X 10 reps. .               Standing HBB stretching by PT and by patient with towel assist;

## 2018-11-12 NOTE — TELEPHONE ENCOUNTER
Patient's daughter called requesting refill for:  glipiZIDE 5 MG Oral Tab  AmLODIPine Besylate 5 MG Oral Tab    Also requesting alternative for:  Glucose Blood (TRUE METRIX BLOOD GLUCOSE TEST) In Vitro Strip   States that strips are too expensive.     To be

## 2018-11-13 NOTE — TELEPHONE ENCOUNTER
Glipizide 5 mg 1 tab bid filled 6-27-18 60 with 3 refills    Amlodipine 5 mg 1 tab daily filled 7-28-18 30 with 2 refill     LOV 5-16-18     Return in about 3 months (around 8/16/2018).      Next apt 12-5-18     Labs   HgbA1C <5.7 % 6.3 Abnormally high

## 2018-11-14 ENCOUNTER — HOSPITAL ENCOUNTER (OUTPATIENT)
Dept: PHYSICAL THERAPY | Facility: HOSPITAL | Age: 68
Setting detail: THERAPIES SERIES
Discharge: HOME | End: 2018-11-14
Attending: ORTHOPAEDIC SURGERY
Payer: MEDICARE

## 2018-11-14 PROCEDURE — 97110 THERAPEUTIC EXERCISES: CPT

## 2018-11-14 NOTE — PROGRESS NOTES
Dx: s/p left rtc repair, sad/dcr, biceps tenotomy. # of Visits:  10 additional... Next MD visit:  Tbd. ..   Fall Risk: standard         Precautions: n/a            Subjective: Patient reports that left shoulder pain is rated 1-2/10 this morning X 10 reps each direction. Cervical AROM x 10 each direction. X 15 EACH. For 15 reps each direction. Sci Fit (8) UEs level 1 hills for 5 minutes. SR 2 x 10 with cueing. 2 x 10 with cueing to improve quality of the movement.  2 x 10.  2 x 10.  3 x 10 side lying. X. Lots of AA/PROM left shoulder with PT assist...   stm left shoulder area by PT.  stm left shoulder area by PT while supine, sitting, and right side lying. .  stm left shoulder by PT while supine and while right side lying.   X. stm left shou in sitting x 10 min R/S at 90 degrees of SF for 1 minute. For 1 minute. Left shoulder r/s at 90 degrees of SF for 1 minute. 1 minute x 2. For 1 minute x 2. For 90 seconds x 1. While right side lying:  AA Scaption and GOMES x 10 each.  Active

## 2018-11-19 ENCOUNTER — HOSPITAL ENCOUNTER (OUTPATIENT)
Dept: PHYSICAL THERAPY | Facility: HOSPITAL | Age: 68
Setting detail: THERAPIES SERIES
Discharge: HOME | End: 2018-11-19
Attending: INTERNAL MEDICINE
Payer: MEDICARE

## 2018-11-19 PROCEDURE — 97110 THERAPEUTIC EXERCISES: CPT

## 2018-11-19 NOTE — PROGRESS NOTES
Dx: s/p left rtc repair, sad/dcr, biceps tenotomy. # of Visits:  10 additional... Next MD visit:  Tbd. ..   Fall Risk: standard         Precautions: n/a            Subjective: Patient reports that left shoulder pain is rated 1-2/10 this morning each direction. Cervical AROM x 10 each direction. X 15 EACH. For 15 reps each direction. Sci Fit (8) UEs level 1 hills for 5 minutes. Sci Fit UEs level 1 hills for 5 minutes. SR 2 x 10 with cueing.   2 x 10 with cueing to improve quality of the m and while right side lying. Lots of AA/PROM left shoulder while supine and while right side lying. X. Lots of AA/PROM left shoulder with PT assist... Lots of AA/PROM left shoulder.      stm left shoulder area by PT.  stm left shoulder area by PT while sup B. .. 2# 2 x 10. Supine   B PUP 2# x 12. .. PUP 2# X 25 2# x 30 reps. .        Standing A and P HBB stretching. A and P HBB stretching while standing. Active/Passive left SE and HBB stretching.   Horizontal abd/ add in supine    cpk in sitting x 10 min R/S

## 2018-11-21 ENCOUNTER — HOSPITAL ENCOUNTER (OUTPATIENT)
Dept: PHYSICAL THERAPY | Facility: HOSPITAL | Age: 68
Setting detail: THERAPIES SERIES
Discharge: HOME | End: 2018-11-21
Attending: INTERNAL MEDICINE
Payer: MEDICARE

## 2018-11-21 PROCEDURE — 97110 THERAPEUTIC EXERCISES: CPT

## 2018-11-21 NOTE — PROGRESS NOTES
Dx: s/p left rtc repair, sad/dcr, biceps tenotomy. # of Visits:  10 additional... Next MD visit:  Tbd. ..   Fall Risk: standard         Precautions: n/a            Subjective: Patient reports that left shoulder pain is rated 0-1/10 this morning cueing prn. . X 10 reps each direction. Cervical AROM x 10 each direction. X 15 EACH. For 15 reps each direction. Sci Fit (8) UEs level 1 hills for 5 minutes. Sci Fit UEs level 1 hills for 5 minutes.   UEs level 1 hills for 3' F and 3' B...   SR 2 x 10 shoulder while supine and while right side lying. cont'd AAROM Lots of AA/PROM left shoulder while supine and while right side lying. X. Lots of AA/PROM left shoulder while supine and while right side lying.   Lots of AA/PROM left shoulder while supine an direction. X 25 each direction. X 25 each. X 25 each. X 25 each. X 25 each. X 25 each direction with cueing prn. .      Supine AA PUP left with PT assist 2 x 10. Supine AA PUP left with PT assist 3 x 10. AA/A PUP supine left 3 x 10.   Supine A PUP 0#

## 2018-11-26 ENCOUNTER — HOSPITAL ENCOUNTER (OUTPATIENT)
Dept: PHYSICAL THERAPY | Facility: HOSPITAL | Age: 68
Setting detail: THERAPIES SERIES
Discharge: HOME | End: 2018-11-26
Attending: INTERNAL MEDICINE
Payer: MEDICARE

## 2018-11-26 PROCEDURE — 97110 THERAPEUTIC EXERCISES: CPT

## 2018-11-26 NOTE — PROGRESS NOTES
Dx: s/p left rtc repair, sad/dcr, biceps tenotomy. # of Visits:  10 additional... Next MD visit:  Tbd. .. Fall Risk: standard         Precautions: n/a            Subjective: Patient reports that left shoulder pain is rated 1/10 this morning. Fit UEs level 1 hills for 5 minutes. UEs level 1 hills for 3' F and 3' B... UEs level 1 hills for 6' total.       3 x 10 and as HEP. Seated SR x 20 with cueing prn. . cont'd w/ verbal and tactile cues    Scapular retraction in sdly w/ tactile cues SR with left shoulder area including rtc mm, pectoralis mm. ..   stm left shoulder area as noted before. X. stm left shoulder area by PT.  stm left shoulder area. stm left shoulder area including pectoralis mm. . stm left shoulder area.       Grade 3 left GH a/p an reps on left. X 15 reps left/right. X 15 reps each arm. X 15 l/fr with cueing. Standing HBB stretching by PT and by patient with towel assist; and as HEP now. X. X.  Standing left IR and ER with yellow band x 10 each.   X.  HBB stretching by

## 2018-11-28 ENCOUNTER — HOSPITAL ENCOUNTER (OUTPATIENT)
Dept: PHYSICAL THERAPY | Facility: HOSPITAL | Age: 68
Setting detail: THERAPIES SERIES
Discharge: HOME | End: 2018-11-28
Attending: INTERNAL MEDICINE
Payer: MEDICARE

## 2018-11-28 PROCEDURE — 97110 THERAPEUTIC EXERCISES: CPT

## 2018-11-28 NOTE — PROGRESS NOTES
Dx: s/p left rtc repair, sad/dcr, biceps tenotomy. # of Visits:  10 additional... Next MD visit:  Tbd. .. Fall Risk: standard         Precautions: n/a            Subjective: Patient reports that left shoulder pain is rated 0/10 this morning. cues SR with red band x 20 reps with cueing prn. .    SE standing with red band x 25 reps. . X 25 reps. .        X 25 reps. . X 25 reps. .        X 25 reps. . For 25 reps. .        For 25 reps. . X 25 reps. .        X 25 reps. . With green band x 20.        With grecaron left shoulder area by PT.  stm left shoulder area. stm left shoulder area including pectoralis mm. . stm left shoulder area. stm left shoulder area. Grade 3 left GH a/p and p/a mobs by PT. Grade 3 left GH a/p and p/a mobs by PT.   Cont;d Grade 3 left G 10 reps. . X 10 reps on left. X 15 reps left/right. X 15 reps each arm. X 15 l/fr with cueing. X 15 l/r. .          Standing HBB stretching by PT and by patient with towel assist; and as HEP now.   X. X.  Standing left IR and ER with yellow band x 10 eac

## 2018-12-05 ENCOUNTER — APPOINTMENT (OUTPATIENT)
Dept: PHYSICAL THERAPY | Facility: HOSPITAL | Age: 68
End: 2018-12-05
Attending: INTERNAL MEDICINE
Payer: MEDICARE

## 2018-12-05 ENCOUNTER — OFFICE VISIT (OUTPATIENT)
Dept: INTERNAL MEDICINE CLINIC | Facility: CLINIC | Age: 68
End: 2018-12-05
Payer: MEDICARE

## 2018-12-05 VITALS
RESPIRATION RATE: 18 BRPM | BODY MASS INDEX: 32 KG/M2 | HEART RATE: 85 BPM | DIASTOLIC BLOOD PRESSURE: 76 MMHG | WEIGHT: 197 LBS | OXYGEN SATURATION: 97 % | SYSTOLIC BLOOD PRESSURE: 134 MMHG | TEMPERATURE: 97 F

## 2018-12-05 DIAGNOSIS — Z23 NEED FOR INFLUENZA VACCINATION: ICD-10-CM

## 2018-12-05 DIAGNOSIS — E11.8 TYPE 2 DIABETES MELLITUS WITH COMPLICATION, WITHOUT LONG-TERM CURRENT USE OF INSULIN (HCC): ICD-10-CM

## 2018-12-05 DIAGNOSIS — Z98.890 STATUS POST SHOULDER SURGERY: ICD-10-CM

## 2018-12-05 DIAGNOSIS — I10 BENIGN ESSENTIAL HTN: Primary | ICD-10-CM

## 2018-12-05 DIAGNOSIS — E87.1 HYPONATREMIA: ICD-10-CM

## 2018-12-05 PROCEDURE — 90653 IIV ADJUVANT VACCINE IM: CPT | Performed by: INTERNAL MEDICINE

## 2018-12-05 PROCEDURE — G0008 ADMIN INFLUENZA VIRUS VAC: HCPCS | Performed by: INTERNAL MEDICINE

## 2018-12-05 PROCEDURE — 99214 OFFICE O/P EST MOD 30 MIN: CPT | Performed by: INTERNAL MEDICINE

## 2018-12-05 RX ORDER — AMLODIPINE BESYLATE 5 MG/1
5 TABLET ORAL DAILY
Qty: 90 TABLET | Refills: 1 | Status: SHIPPED | OUTPATIENT
Start: 2018-12-05 | End: 2019-08-21

## 2018-12-05 RX ORDER — GLIPIZIDE 5 MG/1
5 TABLET ORAL
Qty: 180 TABLET | Refills: 1 | Status: SHIPPED | OUTPATIENT
Start: 2018-12-05 | End: 2019-08-21

## 2018-12-05 NOTE — PATIENT INSTRUCTIONS
- Continue current medications  - Follow up with me in 3 months (March) for chronic issues, earlier as needed for acute issues. - Flu shot given today. - Make sure to get diabetic eye exam done when you have time.     It was a pleasure seeing you in the

## 2018-12-05 NOTE — PROGRESS NOTES
Tejinder Louise is a 80year old male. HPI:   Patient presents with:  Blood Pressure  Patient presents for follow up on chronic issues. Hypertension - blood pressure elevated at previous visit. Normotensive today. DM II - well controlled.   Blood glucose problem, BPH (benign prostatic hypertrophy), Esophageal reflux, PONV (postoperative nausea and vomiting), Stroke ([facility] [address] and Ulcer, esophagus (1/16).   Surgical:  has a past surgical history that includes other; noncontact laser surgery prostate; upper gi en October. Continue amlodipine 5 mg qd. Refilled. - AmLODIPine Besylate 5 MG Oral Tab; Take 1 tablet (5 mg total) by mouth daily. Dispense: 90 tablet; Refill: 1    2.  Type 2 diabetes mellitus with complication, without long-term current use of insulin (H

## 2019-03-01 DIAGNOSIS — E11.9 TYPE 2 DIABETES MELLITUS WITHOUT COMPLICATION, WITH LONG-TERM CURRENT USE OF INSULIN (HCC): ICD-10-CM

## 2019-03-01 DIAGNOSIS — Z79.4 TYPE 2 DIABETES MELLITUS WITHOUT COMPLICATION, WITH LONG-TERM CURRENT USE OF INSULIN (HCC): ICD-10-CM

## 2019-08-21 ENCOUNTER — LAB ENCOUNTER (OUTPATIENT)
Dept: LAB | Age: 69
End: 2019-08-21
Attending: INTERNAL MEDICINE
Payer: MEDICARE

## 2019-08-21 ENCOUNTER — OFFICE VISIT (OUTPATIENT)
Dept: INTERNAL MEDICINE CLINIC | Facility: CLINIC | Age: 69
End: 2019-08-21
Payer: MEDICARE

## 2019-08-21 VITALS
SYSTOLIC BLOOD PRESSURE: 148 MMHG | WEIGHT: 206.5 LBS | HEIGHT: 65 IN | BODY MASS INDEX: 34.41 KG/M2 | HEART RATE: 48 BPM | DIASTOLIC BLOOD PRESSURE: 66 MMHG | RESPIRATION RATE: 16 BRPM | TEMPERATURE: 98 F

## 2019-08-21 DIAGNOSIS — Z86.73 HISTORY OF STROKE: ICD-10-CM

## 2019-08-21 DIAGNOSIS — I10 BENIGN ESSENTIAL HTN: ICD-10-CM

## 2019-08-21 DIAGNOSIS — Z23 NEED FOR PNEUMOCOCCAL VACCINATION: ICD-10-CM

## 2019-08-21 DIAGNOSIS — K21.9 GASTROESOPHAGEAL REFLUX DISEASE WITHOUT ESOPHAGITIS: ICD-10-CM

## 2019-08-21 DIAGNOSIS — N40.0 BENIGN PROSTATIC HYPERPLASIA, UNSPECIFIED WHETHER LOWER URINARY TRACT SYMPTOMS PRESENT: Chronic | ICD-10-CM

## 2019-08-21 DIAGNOSIS — E11.8 TYPE 2 DIABETES MELLITUS WITH COMPLICATION, WITHOUT LONG-TERM CURRENT USE OF INSULIN (HCC): ICD-10-CM

## 2019-08-21 DIAGNOSIS — Z12.5 SCREENING FOR PROSTATE CANCER: ICD-10-CM

## 2019-08-21 DIAGNOSIS — Z00.00 MEDICARE ANNUAL WELLNESS VISIT, SUBSEQUENT: Primary | ICD-10-CM

## 2019-08-21 DIAGNOSIS — M17.0 PRIMARY OSTEOARTHRITIS OF BOTH KNEES: Chronic | ICD-10-CM

## 2019-08-21 DIAGNOSIS — Z00.00 MEDICARE ANNUAL WELLNESS VISIT, SUBSEQUENT: ICD-10-CM

## 2019-08-21 DIAGNOSIS — K44.9 HIATAL HERNIA: ICD-10-CM

## 2019-08-21 DIAGNOSIS — M1A.9XX0 CHRONIC GOUT WITHOUT TOPHUS, UNSPECIFIED CAUSE, UNSPECIFIED SITE: Chronic | ICD-10-CM

## 2019-08-21 DIAGNOSIS — E87.1 HYPONATREMIA: ICD-10-CM

## 2019-08-21 DIAGNOSIS — R00.1 BRADYCARDIA: ICD-10-CM

## 2019-08-21 LAB
ALBUMIN SERPL-MCNC: 3.5 G/DL (ref 3.4–5)
ALBUMIN/GLOB SERPL: 1 {RATIO} (ref 1–2)
ALP LIVER SERPL-CCNC: 83 U/L (ref 45–117)
ALT SERPL-CCNC: 43 U/L (ref 16–61)
ANION GAP SERPL CALC-SCNC: 8 MMOL/L (ref 0–18)
AST SERPL-CCNC: 32 U/L (ref 15–37)
BASOPHILS # BLD AUTO: 0.06 X10(3) UL (ref 0–0.2)
BASOPHILS NFR BLD AUTO: 0.8 %
BILIRUB SERPL-MCNC: 1 MG/DL (ref 0.1–2)
BUN BLD-MCNC: 21 MG/DL (ref 7–18)
BUN/CREAT SERPL: 19.4 (ref 10–20)
CALCIUM BLD-MCNC: 8.2 MG/DL (ref 8.5–10.1)
CHLORIDE SERPL-SCNC: 107 MMOL/L (ref 98–112)
CHOLEST SMN-MCNC: 137 MG/DL (ref ?–200)
CO2 SERPL-SCNC: 25 MMOL/L (ref 21–32)
COMPLEXED PSA SERPL-MCNC: 1.06 NG/ML (ref ?–4)
CREAT BLD-MCNC: 1.08 MG/DL (ref 0.7–1.3)
CREAT UR-SCNC: 164 MG/DL
DEPRECATED RDW RBC AUTO: 51.8 FL (ref 35.1–46.3)
EOSINOPHIL # BLD AUTO: 0.44 X10(3) UL (ref 0–0.7)
EOSINOPHIL NFR BLD AUTO: 5.7 %
ERYTHROCYTE [DISTWIDTH] IN BLOOD BY AUTOMATED COUNT: 14.7 % (ref 11–15)
EST. AVERAGE GLUCOSE BLD GHB EST-MCNC: 143 MG/DL (ref 68–126)
GLOBULIN PLAS-MCNC: 3.6 G/DL (ref 2.8–4.4)
GLUCOSE BLD-MCNC: 135 MG/DL (ref 70–99)
HBA1C MFR BLD HPLC: 6.6 % (ref ?–5.7)
HCT VFR BLD AUTO: 41.6 % (ref 39–53)
HDLC SERPL-MCNC: 31 MG/DL (ref 40–59)
HGB BLD-MCNC: 13.1 G/DL (ref 13–17.5)
IMM GRANULOCYTES # BLD AUTO: 0.02 X10(3) UL (ref 0–1)
IMM GRANULOCYTES NFR BLD: 0.3 %
LDLC SERPL CALC-MCNC: 81 MG/DL (ref ?–100)
LYMPHOCYTES # BLD AUTO: 2.58 X10(3) UL (ref 1–4)
LYMPHOCYTES NFR BLD AUTO: 33.5 %
M PROTEIN MFR SERPL ELPH: 7.1 G/DL (ref 6.4–8.2)
MCH RBC QN AUTO: 29.8 PG (ref 26–34)
MCHC RBC AUTO-ENTMCNC: 31.5 G/DL (ref 31–37)
MCV RBC AUTO: 94.8 FL (ref 80–100)
MICROALBUMIN UR-MCNC: 4.02 MG/DL
MICROALBUMIN/CREAT 24H UR-RTO: 24.5 UG/MG (ref ?–30)
MONOCYTES # BLD AUTO: 0.84 X10(3) UL (ref 0.1–1)
MONOCYTES NFR BLD AUTO: 10.9 %
NEUTROPHILS # BLD AUTO: 3.76 X10 (3) UL (ref 1.5–7.7)
NEUTROPHILS # BLD AUTO: 3.76 X10(3) UL (ref 1.5–7.7)
NEUTROPHILS NFR BLD AUTO: 48.8 %
NONHDLC SERPL-MCNC: 106 MG/DL (ref ?–130)
OSMOLALITY SERPL CALC.SUM OF ELEC: 295 MOSM/KG (ref 275–295)
PLATELET # BLD AUTO: 267 10(3)UL (ref 150–450)
POTASSIUM SERPL-SCNC: 4.3 MMOL/L (ref 3.5–5.1)
RBC # BLD AUTO: 4.39 X10(6)UL (ref 3.8–5.8)
SODIUM SERPL-SCNC: 140 MMOL/L (ref 136–145)
TRIGL SERPL-MCNC: 126 MG/DL (ref 30–149)
TSI SER-ACNC: 2.46 MIU/ML (ref 0.36–3.74)
VLDLC SERPL CALC-MCNC: 25 MG/DL (ref 0–30)
WBC # BLD AUTO: 7.7 X10(3) UL (ref 4–11)

## 2019-08-21 PROCEDURE — 80053 COMPREHEN METABOLIC PANEL: CPT

## 2019-08-21 PROCEDURE — 83036 HEMOGLOBIN GLYCOSYLATED A1C: CPT

## 2019-08-21 PROCEDURE — 36415 COLL VENOUS BLD VENIPUNCTURE: CPT

## 2019-08-21 PROCEDURE — 99214 OFFICE O/P EST MOD 30 MIN: CPT | Performed by: INTERNAL MEDICINE

## 2019-08-21 PROCEDURE — 90732 PPSV23 VACC 2 YRS+ SUBQ/IM: CPT | Performed by: INTERNAL MEDICINE

## 2019-08-21 PROCEDURE — G0009 ADMIN PNEUMOCOCCAL VACCINE: HCPCS | Performed by: INTERNAL MEDICINE

## 2019-08-21 PROCEDURE — 82043 UR ALBUMIN QUANTITATIVE: CPT

## 2019-08-21 PROCEDURE — 85025 COMPLETE CBC W/AUTO DIFF WBC: CPT

## 2019-08-21 PROCEDURE — 84443 ASSAY THYROID STIM HORMONE: CPT

## 2019-08-21 PROCEDURE — 80061 LIPID PANEL: CPT

## 2019-08-21 PROCEDURE — 82570 ASSAY OF URINE CREATININE: CPT

## 2019-08-21 PROCEDURE — G0439 PPPS, SUBSEQ VISIT: HCPCS | Performed by: INTERNAL MEDICINE

## 2019-08-21 RX ORDER — AMLODIPINE BESYLATE 10 MG/1
10 TABLET ORAL DAILY
Qty: 90 TABLET | Refills: 1 | Status: SHIPPED | OUTPATIENT
Start: 2019-08-21 | End: 2019-10-28

## 2019-08-21 RX ORDER — OMEPRAZOLE 40 MG/1
40 CAPSULE, DELAYED RELEASE ORAL
Qty: 90 CAPSULE | Refills: 1 | Status: SHIPPED | OUTPATIENT
Start: 2019-08-21 | End: 2019-10-28

## 2019-08-21 RX ORDER — GLIPIZIDE 5 MG/1
5 TABLET ORAL
Qty: 180 TABLET | Refills: 1 | Status: SHIPPED | OUTPATIENT
Start: 2019-08-21 | End: 2019-10-28

## 2019-08-21 RX ORDER — AMLODIPINE BESYLATE 5 MG/1
5 TABLET ORAL DAILY
Qty: 90 TABLET | Refills: 1 | Status: CANCELLED | OUTPATIENT
Start: 2019-08-21

## 2019-08-21 NOTE — PATIENT INSTRUCTIONS
- Get blood work done today  - We will increase blood pressure medication (amlodipine) to 10 mg daily.  - Follow up in 1 month for blood pressure/pulse. - Follow up with Dr. Keri Malone for joint issues/injections. - Pneumonia shot given today.     It was a

## 2019-08-21 NOTE — PROGRESS NOTES
HPI:   Yong Mccann is a 77year old male who presents for a Medicare Subsequent Annual Wellness visit (Pt already had Initial Annual Wellness) and follow up on chronic medical issues.   His last annual assessment has been over 1 year: Annual Physical due (PHQ-2/PHQ-9): Over the LAST 2 WEEKS   Little interest or pleasure in doing things (over the last two weeks)?: Not at all  Feeling down, depressed, or hopeless (over the last two weeks)?: Not at all  PHQ-2 SCORE: 0     Advanced Directive:   He does NOT hav 08/21/2019    ALB 3.5 08/21/2019    TSH 2.460 08/21/2019    CREATSERUM 1.08 08/21/2019     (H) 08/21/2019        CBC  (most recent labs)   Lab Results   Component Value Date    WBC 7.7 08/21/2019    HGB 13.1 08/21/2019    .0 08/21/2019 headaches  PSYCHE: denies depression or anxiety  ENDOCRINE: denies thyroid history  ALL/ASTHMA: denies hx of allergy or asthma  EXAM:   /66 (BP Location: Left arm, Patient Position: Sitting, Cuff Size: adult)   Pulse (!) 48   Temp 98.1 °F (36.7 °C) ( Iv(metoclopramide Hcl) 10mg Ij 12/21/2015   Deferred Date(s) Deferred   • FLUAD High Dose 72 yr and older (90727) 11/03/2017        ASSESSMENT AND OTHER RELEVANT CHRONIC CONDITIONS:   Jone Cervantes is a 77year old male who presents for a Medicare Assessme 90 capsule; Refill: 1    8. Benign prostatic hyperplasia, unspecified whether lower urinary tract symptoms present  No major symptoms currently. Continue to monitor. 9. Primary osteoarthritis of both knees  Knees are holding up well.   Some shoulder dis 08/21/2019 6.6 (H)       No flowsheet data found.     Fasting Blood Sugar (FSB)Annually Glucose (mg/dL)   Date Value   08/21/2019 135 (H)   01/15/2016 228 (H)   12/21/2015 105 (H)     GLUCOSE (mg/dL)   Date Value   12/30/2013 210 (H)       Cardiovascular found This may be covered with your prescription benefits, but Medicare does not cover unless Medically needed    Zoster   Not covered by Medicare Part B No vaccine history found This may be covered with your pharmacy  prescription benefits      SPECIFIC D

## 2019-08-22 PROBLEM — K21.9 GASTROESOPHAGEAL REFLUX DISEASE WITHOUT ESOPHAGITIS: Chronic | Status: ACTIVE | Noted: 2017-11-30

## 2019-09-20 ENCOUNTER — OFFICE VISIT (OUTPATIENT)
Dept: INTERNAL MEDICINE CLINIC | Facility: CLINIC | Age: 69
End: 2019-09-20
Payer: MEDICARE

## 2019-09-20 VITALS
OXYGEN SATURATION: 96 % | TEMPERATURE: 99 F | WEIGHT: 209.5 LBS | HEIGHT: 65 IN | SYSTOLIC BLOOD PRESSURE: 136 MMHG | DIASTOLIC BLOOD PRESSURE: 62 MMHG | BODY MASS INDEX: 34.91 KG/M2 | HEART RATE: 70 BPM

## 2019-09-20 DIAGNOSIS — I10 BENIGN ESSENTIAL HTN: Primary | Chronic | ICD-10-CM

## 2019-09-20 DIAGNOSIS — M25.50 CHRONIC JOINT PAIN: ICD-10-CM

## 2019-09-20 DIAGNOSIS — G89.29 CHRONIC JOINT PAIN: ICD-10-CM

## 2019-09-20 DIAGNOSIS — E11.8 TYPE 2 DIABETES MELLITUS WITH COMPLICATION, WITHOUT LONG-TERM CURRENT USE OF INSULIN (HCC): Chronic | ICD-10-CM

## 2019-09-20 PROCEDURE — 99214 OFFICE O/P EST MOD 30 MIN: CPT | Performed by: INTERNAL MEDICINE

## 2019-09-20 NOTE — PATIENT INSTRUCTIONS
- Blood pressure and pulse are back to normal today  - Blood work looked good last month: diabetes is well controlled.   Blood count, thyroid, prostate, cholesterol, kidney, liver, electrolytes, thyroid, urine protein tests were all normal.  - Continue curr

## 2019-09-20 NOTE — PROGRESS NOTES
Jennifer Cotto is a 77year old male. HPI:   Patient presents with: Follow - Up: HTN    Patient presents for follow up. Hypertension - elevated readings at last visit. Normotensive today.   Diabetes - well controlled  Chronic joint -ain - still with shou tobacco. He reports that he does not drink alcohol or use drugs.   Wt Readings from Last 6 Encounters:  09/20/19 : 209 lb 8 oz  08/21/19 : 206 lb 8 oz  12/05/18 : 197 lb  10/08/18 : 198 lb  09/20/18 : 197 lb 12.8 oz  09/16/18 : 194 lb 0.1 oz    EXAM:   BP 1

## 2019-10-28 ENCOUNTER — OFFICE VISIT (OUTPATIENT)
Dept: INTERNAL MEDICINE CLINIC | Facility: CLINIC | Age: 69
End: 2019-10-28
Payer: MEDICARE

## 2019-10-28 VITALS
HEIGHT: 65 IN | RESPIRATION RATE: 16 BRPM | HEART RATE: 68 BPM | BODY MASS INDEX: 35.99 KG/M2 | WEIGHT: 216 LBS | DIASTOLIC BLOOD PRESSURE: 70 MMHG | SYSTOLIC BLOOD PRESSURE: 138 MMHG | TEMPERATURE: 99 F

## 2019-10-28 DIAGNOSIS — M79.605 BILATERAL LEG PAIN: Primary | ICD-10-CM

## 2019-10-28 DIAGNOSIS — I10 BENIGN ESSENTIAL HTN: ICD-10-CM

## 2019-10-28 DIAGNOSIS — Z23 NEED FOR INFLUENZA VACCINATION: ICD-10-CM

## 2019-10-28 DIAGNOSIS — E11.8 TYPE 2 DIABETES MELLITUS WITH COMPLICATION, WITHOUT LONG-TERM CURRENT USE OF INSULIN (HCC): ICD-10-CM

## 2019-10-28 DIAGNOSIS — M79.604 BILATERAL LEG PAIN: Primary | ICD-10-CM

## 2019-10-28 DIAGNOSIS — K21.9 GASTROESOPHAGEAL REFLUX DISEASE WITHOUT ESOPHAGITIS: ICD-10-CM

## 2019-10-28 PROCEDURE — G0008 ADMIN INFLUENZA VIRUS VAC: HCPCS | Performed by: INTERNAL MEDICINE

## 2019-10-28 PROCEDURE — 99214 OFFICE O/P EST MOD 30 MIN: CPT | Performed by: INTERNAL MEDICINE

## 2019-10-28 PROCEDURE — 90662 IIV NO PRSV INCREASED AG IM: CPT | Performed by: INTERNAL MEDICINE

## 2019-10-28 RX ORDER — OMEPRAZOLE 40 MG/1
40 CAPSULE, DELAYED RELEASE ORAL
Qty: 90 CAPSULE | Refills: 1 | Status: SHIPPED | OUTPATIENT
Start: 2019-10-28 | End: 2020-08-31

## 2019-10-28 RX ORDER — AMLODIPINE BESYLATE 10 MG/1
10 TABLET ORAL DAILY
Qty: 90 TABLET | Refills: 1 | Status: SHIPPED | OUTPATIENT
Start: 2019-10-28 | End: 2020-08-31

## 2019-10-28 RX ORDER — MELOXICAM 7.5 MG/1
7.5 TABLET ORAL DAILY
Qty: 30 TABLET | Refills: 0 | Status: SHIPPED | OUTPATIENT
Start: 2019-10-28 | End: 2019-11-25

## 2019-10-28 RX ORDER — GLIPIZIDE 5 MG/1
5 TABLET ORAL
Qty: 180 TABLET | Refills: 1 | Status: SHIPPED | OUTPATIENT
Start: 2019-10-28 | End: 2020-08-31

## 2019-10-28 NOTE — PATIENT INSTRUCTIONS
- iniciar la prescripción antiinflamatoria (meloxicam). [place] [facility]. - háganos saber si el dolor y la hinchazón de las piernas no mejoran en 2 semanas    It was a pleasure seeing you in the clinic today.   Thank y

## 2019-10-28 NOTE — PROGRESS NOTES
Km Landaverde is a 77year old male. HPI:   Patient presents with:  Leg Pain: Pt c/o pain to bilateral lower extremeties. Also notices some swelling. He states his skin feels hard. Patient presents to discuss several issues.   He has been dealing with b (1/16).   Surgical:  has a past surgical history that includes other; noncontact laser surgery prostate; upper gi endoscopy performed (1/16= H pyloru negative, esophageal benign biopsioes); colonoscopy,diagnostic (N/A, 2/2/2016); upper gi endoscopy,biopsy ( microalbumin and foot exam in August.  Due for eye exam.  Continue metformin, glipizide as below. - metFORMIN HCl 1000 MG Oral Tab; Take 1 tablet (1,000 mg total) by mouth 2 (two) times daily with meals. Dispense: 180 tablet;  Refill: 1  - glipiZIDE 5 MG

## 2019-11-22 ENCOUNTER — TELEPHONE (OUTPATIENT)
Dept: INTERNAL MEDICINE CLINIC | Facility: CLINIC | Age: 69
End: 2019-11-22

## 2019-11-22 DIAGNOSIS — M79.662 PAIN AND SWELLING OF LEFT LOWER LEG: Primary | ICD-10-CM

## 2019-11-22 DIAGNOSIS — M79.89 PAIN AND SWELLING OF LEFT LOWER LEG: Primary | ICD-10-CM

## 2019-11-22 DIAGNOSIS — M79.604 BILATERAL LEG PAIN: ICD-10-CM

## 2019-11-22 DIAGNOSIS — M79.661 PAIN AND SWELLING OF RIGHT LOWER LEG: ICD-10-CM

## 2019-11-22 DIAGNOSIS — M79.605 BILATERAL LEG PAIN: ICD-10-CM

## 2019-11-22 DIAGNOSIS — M79.89 PAIN AND SWELLING OF RIGHT LOWER LEG: ICD-10-CM

## 2019-11-22 NOTE — TELEPHONE ENCOUNTER
Will check lower extremity dopplers - patient can schedule.   If dopplers are normal, will likely refer patient to an orthopedic specialist.

## 2019-11-22 NOTE — TELEPHONE ENCOUNTER
Per pt gave verbal ok to speak w daughter Carlos Alberto Course. \"  Informed of us order and agreeable. Pt's daughter requested a refill on Meloxicam, as inflammation was decreased.

## 2019-11-22 NOTE — TELEPHONE ENCOUNTER
Patient's Daughter, [facility] (not on HIPPA), calling in on behalf of Shakila Sharif. Stated the patient is not feeling any better since his last visit with Dr Michael Ibarra.   Patient was advised to take RX Meloxicam 7.5 MG Oral Tab, however he is still experiencing the

## 2019-11-25 RX ORDER — MELOXICAM 7.5 MG/1
7.5 TABLET ORAL DAILY
Qty: 30 TABLET | Refills: 0 | Status: SHIPPED | OUTPATIENT
Start: 2019-11-25 | End: 2020-08-31 | Stop reason: ALTCHOICE

## 2020-07-28 NOTE — TELEPHONE ENCOUNTER
Care Transitions case created; awaiting discharge to begin outreach.      We can try a higher dose of glipizide and continue with the same dose of metformin as in the [facility]. Will send in prescriptions. Should call us with updated blood glucose readings after two weeks on the new regimen.   Should follow up in clinic in August

## 2020-08-31 ENCOUNTER — OFFICE VISIT (OUTPATIENT)
Dept: INTERNAL MEDICINE CLINIC | Facility: CLINIC | Age: 70
End: 2020-08-31
Payer: MEDICARE

## 2020-08-31 VITALS
HEIGHT: 65 IN | SYSTOLIC BLOOD PRESSURE: 136 MMHG | DIASTOLIC BLOOD PRESSURE: 62 MMHG | HEART RATE: 80 BPM | BODY MASS INDEX: 35.02 KG/M2 | TEMPERATURE: 99 F | RESPIRATION RATE: 20 BRPM | WEIGHT: 210.19 LBS

## 2020-08-31 DIAGNOSIS — K57.30 DIVERTICULOSIS OF LARGE INTESTINE WITHOUT HEMORRHAGE: ICD-10-CM

## 2020-08-31 DIAGNOSIS — M47.816 ARTHRITIS OF FACET JOINT OF LUMBAR SPINE: ICD-10-CM

## 2020-08-31 DIAGNOSIS — K44.9 HIATAL HERNIA: ICD-10-CM

## 2020-08-31 DIAGNOSIS — E11.8 TYPE 2 DIABETES MELLITUS WITH COMPLICATION, WITHOUT LONG-TERM CURRENT USE OF INSULIN (HCC): ICD-10-CM

## 2020-08-31 DIAGNOSIS — N40.0 BENIGN PROSTATIC HYPERPLASIA, UNSPECIFIED WHETHER LOWER URINARY TRACT SYMPTOMS PRESENT: Chronic | ICD-10-CM

## 2020-08-31 DIAGNOSIS — I10 BENIGN ESSENTIAL HTN: ICD-10-CM

## 2020-08-31 DIAGNOSIS — K21.9 GASTROESOPHAGEAL REFLUX DISEASE WITHOUT ESOPHAGITIS: ICD-10-CM

## 2020-08-31 DIAGNOSIS — M1A.9XX0 CHRONIC GOUT WITHOUT TOPHUS, UNSPECIFIED CAUSE, UNSPECIFIED SITE: Chronic | ICD-10-CM

## 2020-08-31 DIAGNOSIS — M17.0 PRIMARY OSTEOARTHRITIS OF BOTH KNEES: Chronic | ICD-10-CM

## 2020-08-31 DIAGNOSIS — Z00.00 ENCOUNTER FOR SUBSEQUENT ANNUAL WELLNESS VISIT (AWV) IN MEDICARE PATIENT: Primary | ICD-10-CM

## 2020-08-31 DIAGNOSIS — E66.01 SEVERE OBESITY (BMI 35.0-35.9 WITH COMORBIDITY) (HCC): ICD-10-CM

## 2020-08-31 LAB
CARTRIDGE LOT#: 564 NUMERIC
CREAT UR-SCNC: 177 MG/DL
HEMOGLOBIN A1C: 8.5 % (ref 4.3–5.6)
MICROALBUMIN UR-MCNC: 1.45 MG/DL
MICROALBUMIN/CREAT 24H UR-RTO: 8.2 UG/MG (ref ?–30)

## 2020-08-31 PROCEDURE — 83036 HEMOGLOBIN GLYCOSYLATED A1C: CPT | Performed by: INTERNAL MEDICINE

## 2020-08-31 PROCEDURE — 82043 UR ALBUMIN QUANTITATIVE: CPT | Performed by: INTERNAL MEDICINE

## 2020-08-31 PROCEDURE — 99214 OFFICE O/P EST MOD 30 MIN: CPT | Performed by: INTERNAL MEDICINE

## 2020-08-31 PROCEDURE — G0439 PPPS, SUBSEQ VISIT: HCPCS | Performed by: INTERNAL MEDICINE

## 2020-08-31 PROCEDURE — 82570 ASSAY OF URINE CREATININE: CPT | Performed by: INTERNAL MEDICINE

## 2020-08-31 RX ORDER — GLIPIZIDE 10 MG/1
10 TABLET ORAL
Qty: 180 TABLET | Refills: 1 | Status: SHIPPED | OUTPATIENT
Start: 2020-08-31 | End: 2020-10-28

## 2020-08-31 RX ORDER — OMEPRAZOLE 40 MG/1
40 CAPSULE, DELAYED RELEASE ORAL
Qty: 90 CAPSULE | Refills: 1 | Status: SHIPPED | OUTPATIENT
Start: 2020-08-31 | End: 2020-10-28

## 2020-08-31 RX ORDER — AMLODIPINE BESYLATE 10 MG/1
10 TABLET ORAL DAILY
Qty: 90 TABLET | Refills: 1 | Status: SHIPPED | OUTPATIENT
Start: 2020-08-31 | End: 2020-10-28

## 2020-08-31 RX ORDER — GLIPIZIDE 5 MG/1
5 TABLET ORAL
Qty: 180 TABLET | Refills: 1 | Status: CANCELLED | OUTPATIENT
Start: 2020-08-31

## 2020-08-31 NOTE — PROGRESS NOTES
HPI:   Jorge L Hay is a 72year old male who presents for a Medicare Subsequent Annual Wellness visit (Pt already had Initial Annual Wellness) and follow up on chronic issues.   His last annual assessment has been over 1 year: Annual Physical due on 08/21 Care Team:  Missy Gaytan MD as PCP - General (Internal Medicine)  Missy Gaytan MD as PCP - Peter Dalton MD as Consulting Physician ([address] [place] [address])    Patient Active Problem List:     Benign prostatic hyperplasia     Gout     Type 2 diabetes m esophagus (1/16).     He  has a past surgical history that includes other; noncontact laser surgery prostate; upper gi endoscopy performed (1/16= H pyloru negative, esophageal benign biopsioes); colonoscopy,diagnostic (N/A, 2/2/2016); upper gi endoscopy,bio without murmurs. No clubbing, cyanosis or edema.   GI: soft non tender nondistended no hepatosplenomegaly, bowel sounds throughout  NEURO: CN II-XII intact, 5/5 strength all extremities  Bilateral barefoot skin diabetic exam is normal, visualized feet and A1C  - MICROALB/CREAT RATIO, RANDOM URINE; Future  - metFORMIN HCl 1000 MG Oral Tab; Take 1 tablet (1,000 mg total) by mouth 2 (two) times daily with meals. Dispense: 180 tablet; Refill: 1  - glipiZIDE 10 MG Oral Tab;  Take 1 tablet (10 mg total) by mouth poor?: No  How does the patient maintain a good energy level?: Daily Walks  How would you describe your daily physical activity?: Moderate  How would you describe your current health state?: Fair  How do you maintain positive mental well-being?: Singh CARTER 08/21/2019 Please get once after your 66th birthday    Hepatitis B for Moderate/High Risk No vaccine history found Medium/high risk factors:   End-stage renal disease   Hemophiliacs who received Factor VIII or IX concentrates   Clients of institutions for

## 2020-08-31 NOTE — PATIENT INSTRUCTIONS
- Aumentaremos la dosis de glipizida a 10 mg dos veces al día.   - Continuar con todos los demás medicamentos  - Seguimiento conmigo en 3 meses  - Intente hacerse análisis de [place] en ayunas antes de volver a verme  - Consulte con la clínica oftalmológica

## 2020-09-07 ENCOUNTER — MOBILE ENCOUNTER (OUTPATIENT)
Dept: INTERNAL MEDICINE CLINIC | Facility: CLINIC | Age: 70
End: 2020-09-07

## 2020-09-07 NOTE — PROGRESS NOTES
Received page from pt’s daughter Danay Alexander) who speaks minimal English. Per daughter, patient has developed numbness on side of face and arm today. Instructed to call 911 for immediate transport to nearest ED. Pt understood and agreed to plan.

## 2020-10-07 ENCOUNTER — OFFICE VISIT (OUTPATIENT)
Dept: INTERNAL MEDICINE CLINIC | Facility: CLINIC | Age: 70
End: 2020-10-07
Payer: MEDICARE

## 2020-10-07 VITALS
TEMPERATURE: 98 F | HEART RATE: 68 BPM | DIASTOLIC BLOOD PRESSURE: 70 MMHG | HEIGHT: 65 IN | BODY MASS INDEX: 35.65 KG/M2 | WEIGHT: 214 LBS | SYSTOLIC BLOOD PRESSURE: 138 MMHG | RESPIRATION RATE: 16 BRPM

## 2020-10-07 DIAGNOSIS — Z23 NEED FOR INFLUENZA VACCINATION: ICD-10-CM

## 2020-10-07 DIAGNOSIS — E11.8 TYPE 2 DIABETES MELLITUS WITH COMPLICATION, WITHOUT LONG-TERM CURRENT USE OF INSULIN (HCC): Chronic | ICD-10-CM

## 2020-10-07 DIAGNOSIS — Z86.73 HISTORY OF STROKE: Primary | ICD-10-CM

## 2020-10-07 DIAGNOSIS — I10 BENIGN ESSENTIAL HTN: Chronic | ICD-10-CM

## 2020-10-07 PROBLEM — I63.9 CVA (CEREBRAL VASCULAR ACCIDENT) (HCC): Status: ACTIVE | Noted: 2020-09-08

## 2020-10-07 PROCEDURE — 90662 IIV NO PRSV INCREASED AG IM: CPT | Performed by: INTERNAL MEDICINE

## 2020-10-07 PROCEDURE — 99214 OFFICE O/P EST MOD 30 MIN: CPT | Performed by: INTERNAL MEDICINE

## 2020-10-07 PROCEDURE — 1111F DSCHRG MED/CURRENT MED MERGE: CPT | Performed by: INTERNAL MEDICINE

## 2020-10-07 PROCEDURE — G0008 ADMIN INFLUENZA VIRUS VAC: HCPCS | Performed by: INTERNAL MEDICINE

## 2020-10-07 RX ORDER — ATORVASTATIN CALCIUM 10 MG/1
10 TABLET, FILM COATED ORAL NIGHTLY
COMMUNITY
Start: 2020-09-09 | End: 2020-10-07

## 2020-10-07 RX ORDER — ATORVASTATIN CALCIUM 10 MG/1
10 TABLET, FILM COATED ORAL NIGHTLY
Qty: 90 TABLET | Refills: 3 | Status: SHIPPED | OUTPATIENT
Start: 2020-10-07

## 2020-10-07 RX ORDER — ASPIRIN 325 MG
325 TABLET ORAL DAILY
COMMUNITY
End: 2020-10-07

## 2020-10-07 RX ORDER — ASPIRIN 325 MG
325 TABLET ORAL DAILY
Qty: 90 TABLET | Refills: 3 | Status: SHIPPED | OUTPATIENT
Start: 2020-10-07

## 2020-10-07 NOTE — PATIENT INSTRUCTIONS
- Continúe con los [employer], [place] (medicamento para el [place]) y [place]. Recargas enviadas a farmacia.  - Intentaremos establecer fisioterapia en casa.   - Hazme un seguimiento en diciembre después de que regreses [id number] [facility]. As

## 2020-10-07 NOTE — PROGRESS NOTES
Stephen Miramontes is a 72year old male. HPI:   Patient presents with:  Hospital F/U: Vilma Kovacs 9/7-9/9    Patient presents for follow up from previous hospitalization.   He presented to [facility] ED on 9/7/20 with complaint of 3 days of right-sided upper 10 MG Oral Tab, Take 1 tablet (10 mg total) by mouth 2 (two) times daily before meals. , Disp: 180 tablet, Rfl: 1  Medical:  has a past medical history of Constipation, Hyponatremia, Nausea, PONV (postoperative nausea and vomiting), [facility] and Ulcer, sub-acute left thalamic infarct. Patient still with some residual numbness in face and right hand/arm. Good strength. Not driving since stroke. No PT/OT. Hard for him to leave home as he is not driving.   With recent stroke he is at risk for fall/injur

## 2020-10-22 ENCOUNTER — TELEPHONE (OUTPATIENT)
Dept: INTERNAL MEDICINE CLINIC | Facility: CLINIC | Age: 70
End: 2020-10-22

## 2020-10-22 NOTE — TELEPHONE ENCOUNTER
Ema Gloria px therapist from [facility] stated the in home therapy is done, pt does want outpatient therapy, the rn requested to please reach out to the pt for outpatient px therapy. Please call Ema Gloria with any questions.

## 2020-10-28 ENCOUNTER — OFFICE VISIT (OUTPATIENT)
Dept: INTERNAL MEDICINE CLINIC | Facility: CLINIC | Age: 70
End: 2020-10-28
Payer: MEDICARE

## 2020-10-28 VITALS — BODY MASS INDEX: 37 KG/M2 | WEIGHT: 220.38 LBS

## 2020-10-28 DIAGNOSIS — K21.9 GASTROESOPHAGEAL REFLUX DISEASE WITHOUT ESOPHAGITIS: ICD-10-CM

## 2020-10-28 DIAGNOSIS — E11.8 TYPE 2 DIABETES MELLITUS WITH COMPLICATION, WITHOUT LONG-TERM CURRENT USE OF INSULIN (HCC): ICD-10-CM

## 2020-10-28 DIAGNOSIS — Z53.8 APPOINTMENT CANCELED BY HOSPITAL: Primary | ICD-10-CM

## 2020-10-28 DIAGNOSIS — I10 BENIGN ESSENTIAL HTN: ICD-10-CM

## 2020-10-28 RX ORDER — GLIPIZIDE 10 MG/1
10 TABLET ORAL
Qty: 180 TABLET | Refills: 1 | Status: SHIPPED | OUTPATIENT
Start: 2020-10-28

## 2020-10-28 RX ORDER — OMEPRAZOLE 40 MG/1
40 CAPSULE, DELAYED RELEASE ORAL
Qty: 90 CAPSULE | Refills: 1 | Status: SHIPPED | OUTPATIENT
Start: 2020-10-28

## 2020-10-28 RX ORDER — AMLODIPINE BESYLATE 10 MG/1
10 TABLET ORAL DAILY
Qty: 90 TABLET | Refills: 1 | Status: SHIPPED | OUTPATIENT
Start: 2020-10-28

## 2021-02-03 DIAGNOSIS — Z23 NEED FOR VACCINATION: ICD-10-CM

## 2021-03-12 NOTE — DISCHARGE SUMMARY
[facility] HOSPITALIST  DISCHARGE SUMMARY     Michael Noel Patient Status:  Inpatient    1950 MRN [id number]   [facility] 3SW-A Attending Boy Shipley MD   Hosp Day # 1 PCP Bandar Shepherd MD     Date of Admission: 2018  Date of Janett Watts deemed that diagnosis was not likely infectious like osteo or disciitis and therefore not likely worth the risk of biopsy. juliocesar was instructed to return if pain comes back and/or if fevers return. He understood and agreed.   Ultimately, it is unclear w 75.7 directed by your doctor or nurse    Bring a paper prescription for each of these medications  · AmLODIPine Besylate 5 MG Tabs         [place]    Follow-up appointment:   Chucho Jimenez MD  [address] [place] [address] [phone]

## 2021-05-28 ENCOUNTER — PATIENT OUTREACH (OUTPATIENT)
Dept: INTERNAL MEDICINE CLINIC | Facility: CLINIC | Age: 71
End: 2021-05-28

## 2021-05-28 NOTE — PROGRESS NOTES
Pt is due for [address] in August.   Called to schedule however dtr informed me he is in [facility] until around July 2021.

## 2021-12-26 NOTE — ED AVS SNAPSHOT
Called Harvey Costa. Placed on hold.  No one returned to the line. Waited on hold for 6 minutes.  Will call back shortly.      Jeannette Ruiz RN  12/25/21 0355     Tejinder Louise   MRN: [id number]    Department:  [facility] Emergency Department   Date of Visit:  9/16/2018           Disclosure     Insurance plans vary and the physician(s) referred by the ER may not be covered by your plan.  Please contact your tell this physician (or your personal doctor if your instructions are to return to your personal doctor) about any new or lasting problems. The primary care or specialist physician will see patients referred from the [facility] Emergency Department.  Nena Patrick

## 2022-07-21 ENCOUNTER — PATIENT OUTREACH (OUTPATIENT)
Dept: INTERNAL MEDICINE CLINIC | Facility: CLINIC | Age: 72
End: 2022-07-21

## 2023-02-03 ENCOUNTER — TELEPHONE (OUTPATIENT)
Dept: INTERNAL MEDICINE CLINIC | Facility: CLINIC | Age: 73
End: 2023-02-03

## 2024-03-15 ENCOUNTER — PATIENT OUTREACH (OUTPATIENT)
Dept: INTERNAL MEDICINE CLINIC | Facility: CLINIC | Age: 74
End: 2024-03-15

## (undated) DEVICE — CANNULA 7MM TWIST AR-6570

## (undated) DEVICE — VAPR S 90 ELECTRODE 40 MM 90 DEGREES SUCTION WITH INTEGRATED HANDPIECE: Brand: VAPR

## (undated) DEVICE — SUTURE ETHIBOND 1 CT-1

## (undated) DEVICE — STERILE POLYISOPRENE POWDER-FREE SURGICAL GLOVES: Brand: PROTEXIS

## (undated) DEVICE — SUTURE VICRYL 2-0 FSL

## (undated) DEVICE — 1200CC GUARDIAN II: Brand: GUARDIAN

## (undated) DEVICE — SYRINGE 20CC LL TIP

## (undated) DEVICE — 3M™ IOBAN™ 2 ANTIMICROBIAL INCISE DRAPE 6650EZ: Brand: IOBAN™ 2

## (undated) DEVICE — SHOULDER TRACTION KIT AR-1635

## (undated) DEVICE — Device: Brand: STABLECUT®

## (undated) DEVICE — KENDALL SCD EXPRESS SLEEVES, KNEE LENGTH, MEDIUM: Brand: KENDALL SCD

## (undated) DEVICE — ZIMMER® STERILE DISPOSABLE TOURNIQUET CUFF WITH PLC, DUAL PORT, SINGLE BLADDER, 34 IN. (86 CM)

## (undated) DEVICE — [AGGRESSIVE PLUS CUTTER, ARTHROSCOPIC SHAVER BLADE,  DO NOT RESTERILIZE,  DO NOT USE IF PACKAGE IS DAMAGED,  KEEP DRY,  KEEP AWAY FROM SUNLIGHT]: Brand: FORMULA

## (undated) DEVICE — FILTERLINE NASAL ADULT O2/CO2

## (undated) DEVICE — 3M™ RED DOT™ MONITORING ELECTRODE WITH FOAM TAPE AND STICKY GEL, 50/BAG, 20/CASE, 72/PLT 2570: Brand: RED DOT™

## (undated) DEVICE — [AGGRESSIVE 6-FLUTE ROUND BUR, ARTHROSCOPIC SHAVER BLADE,  DO NOT RESTERILIZE,  DO NOT USE IF PACKAGE IS DAMAGED,  KEEP DRY,  KEEP AWAY FROM SUNLIGHT]: Brand: FORMULA

## (undated) DEVICE — BOWL CEMENT MIX QUICK-VAC

## (undated) DEVICE — PADDING CAST COTTON  4

## (undated) DEVICE — Device

## (undated) DEVICE — SOL  .9 1000ML BTL

## (undated) DEVICE — FORCEP BIOPSY RJ4 LG CAP W/ND

## (undated) DEVICE — Device: Brand: DEFENDO AIR/WATER/SUCTION AND BIOPSY VALVE

## (undated) DEVICE — GOWN,SIRUS,FABRIC-REINFORCED,X-LARGE: Brand: MEDLINE

## (undated) DEVICE — HANDLE LIGHT ECONOMY

## (undated) DEVICE — NON-ADHERENT STRIPS,OIL EMULSION: Brand: CURITY

## (undated) DEVICE — TOTAL KNEE CDS: Brand: MEDLINE INDUSTRIES, INC.

## (undated) DEVICE — PIN HEADED 48MM

## (undated) DEVICE — #15 STERILE STAINLESS BLADE: Brand: STERILE STAINLESS BLADES

## (undated) DEVICE — NEEDLE SCORPION AR-13995N

## (undated) DEVICE — CHLORAPREP 26ML APPLICATOR

## (undated) DEVICE — 10K/24K ARTHROSCOPY INFLOW TUBE SET: Brand: 10K/24K

## (undated) DEVICE — ENDOSCOPY PACK UPPER: Brand: MEDLINE INDUSTRIES, INC.

## (undated) DEVICE — HOOD, PEEL-AWAY: Brand: FLYTE

## (undated) DEVICE — SHOULDER ARTHROSCOPY CDS-LF: Brand: MEDLINE INDUSTRIES, INC.

## (undated) DEVICE — WRAP COOLING KNEE W/ICE PILLOW

## (undated) DEVICE — SUTURE ETHILON 2-0 FS

## (undated) DEVICE — STOCKINETTE HYDROMED 8X6

## (undated) DEVICE — SOL LACT RINGERS 3000ML

## (undated) DEVICE — STERILE PATIENT PROTECTIVE PAD FOR IMP® KNEE POSITIONERS & COHESIVE WRAP (10 / CASE): Brand: DE MAYO KNEE POSITIONER®

## (undated) NOTE — Clinical Note
Today a1c at 8.9 from 11.9 still needs  Eye exam though made him an appt with laura. A lot of umbilical discomfort, and nausea.  Plan to return in 4 weeks post knee surgery, and will try to change to [facility] thanks

## (undated) NOTE — LETTER
10/04/18        Franci Rojas  1949 [facility] [address]      Dear Meme Velazquez records indicate that you have outstanding lab work and or testing that was ordered for you and has not yet been completed:  Orders Placed This Encounter      Wayne

## (undated) NOTE — LETTER
[facility] Testing Department  Phone: [phone]  Right Fax: [phone]  [facility] [place] 20 By: MARIKA Luciano RN Date: 18    Patient Name: [facility]  Surgery Date: 2018    CSN: [id number]  Medical Record: [id number]   :

## (undated) NOTE — IP AVS SNAPSHOT
Patient Demographics     Address  [address] [place], [place] [address] [address] [address] [address] Phone  [phone] [facility])  [phone] (Mobile) *Preferred* E-mail Address  [email or web address]      Emergency Contact(s)     Name [address] [place] [address] activities with your surgeon. Restrictions  ? For knee replacement surgery, follow instructions provided by physical therapy. ? Do NOT put a pillow under your knee as it may be more difficult to straighten afterwards. No smoking  ? Avoid smoking. Discomfort  ? Surgical discomfort is normal for one to two months. ? Have realistic goals and keep a positive outlook. ? You may need pain medication regularly (every 4-6 hours) the first 2 weeks and then begin to decrease how often you are taking it. Prevention of infection and promotion of healing  ? Good hand washing is important. Everyone should wash their hands or use hand  as soon as they walk in your house-whether they live there or are visiting. ?  Keep bed linen/clothing freshly launde ? Increased pain at incision not relieved by pain medication. Signs of blood clot  ? Pain, excessive tenderness, redness, or swelling in leg or calf (other than incision site).   (CALL SURGEON)      Go directly to the ER or CALL 911 if  you:  ? b [address] [place] [address] Dr Padmini Coto MD In 2 weeks.     Specialties:  SURGERY, ORTHOPEDIC, Surgery, Orthopaedic, Sports Medicine  Contact information:  Anibal Jackson 32 70 61 Wash scalp and face daily   Ele Dhaliwal PA-C         Lisinopril-Hydrochlorothiazide 20-12.5 MG Tabs  Next dose due:  12/27/17 @ 0900      Take 1 tablet by mouth once daily.    Sharri Casas MD         MetFORMIN HCl 1000 MG Tabs  Commonly known as: [id number] Pantoprazole Sodium (PROTONIX) EC tab 40 mg 12/26/17 0507 Given      [id number] Senna (SENOKOT) tab TABS 17.2 mg 12/25/17 2159 Given      [id number] TiZANidine HCl (ZANAFLEX) tab 2 mg 12/26/17 0929 Given      [id number] TraMADol HCl (ULTRAM) tab 50 Preop H&P by Dr. Gaye Adair on 12/18/17 was reviewed. No changes. Plan is to proceed with a right total knee arthroplasty for severe DJD right knee. Risks and benefits were discussed. [RE.1]    Electronically signed by Kishor Huerta MD on 12/22/2017 POLYPECTOMY [id number], [phone];  Surgeon: Joycelyn Lopez MD;  Location: [address] [place] [address]  No date: NONCONTACT LASER SURGERY PROSTATE  No date: OTHER      Comment: prostate  1/16= H pyloru negative, esophagea /68 (BP Location: Right arm)   Pulse 95   Temp 98 °F (36.7 °C) (Oral)   Resp 16   Ht 5' 6\" (1.676 m)   Wt 198 lb 13.7 oz (90.2 kg)   SpO2 97%   BMI 32.10 kg/m²   General: No acute distress. Alert, Welsh speaking. HEENT: Normocephalic atraumatic. Patient will require inpatient services that will reasonably be expected to span two midnight's based on the clinical documentation in H+P. Based on patients current state of illness, I anticipate that, after discharge, patient will require TBD. [MM.1] COLONOSCOPY, POSSIBLE BIOPSY, POSSIBLE                POLYPECTOMY 90456, 25314;  Surgeon: Janet Sood MD;  Location: [address] [place] [address]  No date: NONCONTACT LASER SURGERY PROSTATE  No date: OTHER FUNCTIONAL ABILITY STATUS  Gait Assessment   Gait Assistance: Dependent assistance (Actual assist - mod/min of 2, max to advance left LE)  Distance (ft): 3 ft  Assistive Device: Rolling walker  Pattern: R Decreased stance time;R Flexed knee  Stoop/Curb Ass by PT to address goals from evaluation     DISCHARGE RECOMMENDATIONS  PT Discharge Recommendations: Sub-acute rehabilitation    PLAN  PT Treatment Plan: Bed mobility; Patient education;Gait training;Range of motion;Strengthening;Transfer training  Rehab Pot • Unspecified essential hypertension        Past Surgical History  Past Surgical History:  2/2/2016: COLONOSCOPY,DIAGNOSTIC N/A      Comment: Procedure: ESOPHAGOGASTRODUODENOSCOPY,                COLONOSCOPY, POSSIBLE BIOPSY, POSSIBLE                POLYPE -   Need to walk in hospital room?: A Lot   -   Climbing 3-5 steps with a railing?: A Lot       AM-PAC Score:  Raw Score: 12   PT Approx Degree of Impairment Score: 68.66%   Standardized Score (AM-PAC Scale): 35.33   CMS Modifier (G-Code): CL    FUNCTIONAL DISCHARGE RECOMMENDATIONS  PT Discharge Recommendations: Sub-acute rehabilitation     PLAN  PT Treatment Plan: Bed mobility; Patient education;Gait training;Range of motion;Strengthening;Transfer training  Rehab Potential : Fair  Frequency (Obs): BID    CUR Comment: Procedure: ESOPHAGOGASTRODUODENOSCOPY,                COLONOSCOPY, POSSIBLE BIOPSY, POSSIBLE                POLYPECTOMY 65198, 44126;  Surgeon: Yoan Wiggins MD;  Location: [address] [place] [address] date: Raw Score: 14   PT Approx Degree of Impairment Score: 61.29%   Standardized Score (AM-PAC Scale): 38.1   CMS Modifier (G-Code): CL    FUNCTIONAL ABILITY STATUS  Gait Assessment   Gait Assistance: Minimum assistance  Distance (ft): 5  Assistive Device: Roll functional strengthening, and patient/family education. Given continued limitations, continue to anticipate subacute rehab placement upon discharge though will update goals and recommendations pending progress.      DISCHARGE RECOMMENDATIONS  PT Discharge R There are tricompartmental osteoarthritic changes present in the knee, especially along the medial and patellofemoral compartments, with joint space narrowing and osteophyte present.small-moderate suprapatellar joint effusion. No acute fracture seen.  No ambulation, pt lives with spouse and required assistance w/ iADLs (transportation, shopping, cooking, cleaning, LB dressing). [AJ.2]    SUBJECTIVE[AJ.1]  Pt drowsy, required multiple prompts to open eyes and remain awake, stating \"hi, yes, no\" in re[AJ.2] O2 Saturation: 97%  Liters of O2:  2  Heart Rate: 107 at rest  Heart Rate: with activity up to 142 with LE ex in sitting  Blood Pressure: 96/72 at MPK[ET.5]    AM-PAC '6-Clicks' [address]  How much difficulty does the patient cu Transfer training[AJ.2]    Patient End of Session: In bed;Call light within reach;RN aware of session/findings; All patient questions and concerns addressed; Family present;SCDs in place; Ice applied    ASSESSMENT   Patient is a 78year old male admitted on 1 Goal #3 Patient is able to ambulate[AJ.1] 50[AJ.2] feet with assist device:[AJ.1] walker - rolling[AJ.2] at assistance level:[AJ.1] moderate assistance[AJ.2]     Goal #4    Goal #5    Goal #6    Goal Comments: Goals established on 12/23/2017[AJ.1]     Attr Past Surgical History[MM. 1]  Past Surgical History:  2/2/2016: COLONOSCOPY,DIAGNOSTIC N/A      Comment: Procedure: ESOPHAGOGASTRODUODENOSCOPY,                COLONOSCOPY, POSSIBLE BIOPSY, POSSIBLE                POLYPECTOMY 16165, 81726;  Surgeon: Myrtle Wild, Sit to Stand: Dependent assistance (max A x2)[MM. 2]    Skilled Therapy Provided:  OT educated pt and family via Db Huerta 139  on goals for session. MAx A of two for supine to sit.  Pt needed to use bilat UEs to support himself in sitting , max A for thre OT Goals:      ADL Goals  Pt will perform a grooming task seated with s/u  PROGRESSING   Pt will perform LB dressing w/ moderate assistance and AE as needed   PROGRESSING   Pt will perform toileting: with moderate assistance  PROGRESSING      Functional • Ulcer, esophagus 1/16   • Unspecified essential hypertension        Past Surgical History  Past Surgical History:  2/2/2016: COLONOSCOPY,DIAGNOSTIC N/A      Comment: Procedure: ESOPHAGOGASTRODUODENOSCOPY,                COLONOSCOPY, POSSIBLE BIOPSY, POSS Standardized Score (AM-PAC Scale): 33.39  CMS Modifier (G-Code): CK    FUNCTIONAL TRANSFER ASSESSMENT  Supine to Sit : Dependent assistance (max Ax2 )  Sit to Stand: Dependent assistance (max Ax2 )    Skilled Therapy Provided: Pt was found in bed in a semi dysfunction, decreased endurance, balance, strength, ROM and functional mobility.  These deficit areas contribute to the patient being unable to independently complete the following daily self care tasks: dressing, bathing, toileting, meal preparation, laun Room Number: 359/359-A  Session: 1   Number of Visits to Meet Established Goals: 5    Presenting Problem: s/p R TKA on 12/22/17     History related to current admission: Pt is 78year old male admitted on 12/22/2017 from home.  Pt diagnosed with DJD per Maria Antonia Maxwell O2 Saturation: 95%  Room air   BP sitting EOB: [birth year]  BP sitting in chair: 99/56  BP sitting in chair after 5 mins: 91/51  RN immediately informed of BP readings      ACTIVITIES OF DAILY LIVING ASSESSMENT  AM-PAC ‘6-Clicks’ Inpatient Daily Activity Short Pt is a 77 y/o M admitted to [address] [place] [address] with an admitting diagnosis of s/p R TKA on 12/22/17. Pt was previously functioning independently including all BADL/IADL and driving.  Pt is currently functioning significantly below baseline Pt will complete BSC with mod assist  PROGRESSING[LD.1]           Attribution Key    LD. 1 - Brenda Mariee on 12/24/2017  8:23 AM                     Video Swallow Study Notes     No notes of this type exist for this encounter.       SLP Notes     No notes

## (undated) NOTE — ED AVS SNAPSHOT
Kirk David   MRN: [id number]    Department:  [facility] Emergency Department   Date of Visit:  11/18/2017           Disclosure     Insurance plans vary and the physician(s) referred by the ER may not be covered by your plan.  Please contact your If you have been prescribed any medication(s), please fill your prescription right away and begin taking the medication(s) as directed    If the emergency physician has read X-rays, these will be re-interpreted by a radiologist.  If there is a significant

## (undated) NOTE — LETTER
Last Revised 02/07/06  Obstructive Sleep Apnea Questionnaire    Clinical signs and symptoms suggesting the possibility of MARY    1. Predisposing physical characteristics (positive with any of the following present)  ? BMI 35kg/m²  ?  Craniofacial abnormalit pauses which are frightening to the observer, patient regularly falls asleep within minutes after being left unstimulated) in which case they should be treated as though they have severe sleep apnea.     The sleep laboratory’s assessment (none, mild, modera C. Requirement for postoperative opioids.                Opioid requirement             Points   None 0    Low dose oral opiod 1    High dose oral opioids, parenteral or neuraxial opiods 3      Point Total for C        Estimation of Perioperative Ris

## (undated) NOTE — LETTER
12/23/19        Keltoncharity Simms  [address] [facility] [address]      Dear Ute Aid records indicate that you have outstanding lab work and or testing that was ordered for you and has not yet been completed:  Orders Placed This Encounter      US V

## (undated) NOTE — Clinical Note
Going to [facility] for 2 months, BG stable, have reduced insulin by 1/2 hoping to further lower. Much better.

## (undated) NOTE — LETTER
10/05/20        Tejinder Louise  [address] [address]      Dear Jake Campbell records indicate that you have outstanding lab work and or testing that was ordered for you and has not yet been completed: Fasting Lab Work   To keep our patients

## (undated) NOTE — Clinical Note
Today at 7.3 today from 11.9 doing well post knee replacement all metrix met but eye trying to wean off insulin will keep you updated thanks

## (undated) NOTE — LETTER
[facility] Testing Department  Phone: [phone]  Right Fax: [phone]  [facility] 20 By:  Deedee Patel RN Date: 12/21/17    Patient Name: Frank Blas  Surgery Date: 12/22/2017    CSN: [id number]  Medical Record: [id number]

## (undated) NOTE — LETTER
Patient Name: Stephen Miramontes  YOB: 1950          MRN number:  [id number]  Date:  9/11/2018  Referring Physician:  Ainsley Najera MD.          UPPER EXTREMITY EVALUATION:    Referring Physician: Dr. Mateusz Reyes.   Diagnosis: s/p left rtc repair, S ER: R 4+/5; L nt  IR: R 5-/5; L nt     Today’s Treatment and Response: Patient education provided on post-op protocol and precautions and POC.   Patient was instructed in and issued a HEP for: codman's exercise, active left wrist/hand exercises, yellow putt X___________________________________________________ Date____________________    Certification From: 0/17/1796  To:12/10/2018

## (undated) NOTE — Clinical Note
FYI, TCM call made, see notes. NCM attempted to schedule a TCM HFU appointment patient's daughter Rui Mcfadden states she will call to schedule. Message sent to MD's office.

## (undated) NOTE — Clinical Note
Pt to establish care with your office, [place] Islander speaking only delayed trip to [facility] to address DM, has GERD, renal cyst, fatty liver, daughter stated severe nausea wants to vomit since d/c from hospital. Needs appt asap with someone for this is you all can

## (undated) NOTE — LETTER
Patient Name: Renita Ngo  YOB: 1950          MRN number:  [id number]  Date:  10/25/2018  Referring Physician:  Dr. Ricky Castrejon MD.     Progress Summary    Pt has attended 10 visits in Physical Therapy to date.     Subjective: Patient reports th education, and strengthening per protocol and as tolerated. Thank you for your referral.   Please co-sign or sign and return this letter via fax as soon as possible to [phone].    If you have any questions, please contact me at Dept: [phone]

## (undated) NOTE — LETTER
DECLINATION FORM    [address] [facility] provides interpreters for patients who are deaf, hearing impaired, or have Limited [place] Proficiency in order to ensure thee patients an equal opportunity to benefit from services.  There Patient Name: Elier Willis     : 1950    Page 1 of 1     Printed: @DATE      Medical Record #: [id number]   Revised (03)

## (undated) NOTE — ED AVS SNAPSHOT
Taras Gomez   MRN: [id number]    Department:  [facility] Emergency Department   Date of Visit:  7/3/2018           Disclosure     Insurance plans vary and the physician(s) referred by the ER may not be covered by your plan.  Please contact your i tell this physician (or your personal doctor if your instructions are to return to your personal doctor) about any new or lasting problems. The primary care or specialist physician will see patients referred from the [facility] Emergency Department.  Jonatan Kiran

## (undated) NOTE — LETTER
[facility]  [address] [facility], [address] [place] [address]    Consent for Operation    Date: __________________    Time: _______________    1.  I authorize the performance upon Deepthi Garza the following operation:    Esophagogastroduodenoscopy with possi videotape. [facility] will not be responsible for storage or maintenance of this tape. 6. For the purpose of advancing medical education, I consent to the admittance of observers to the Operating Room.     7. I authorize the use of any specimen, organs Signature of Patient:   ___________________________    When the patient is a minor or mentally incompetent to give consent:  Signature of person authorized to consent for patient: ___________________________   Relationship to patient: _____________________ drugs/illegal medications). Failure to inform my anesthesiologist about these medicines may increase my risk of anesthetic complications. · If I am allergic to anything or have had a reaction to anesthesia before.     3. I understand how the anesthesia med I have discussed the procedure and information above with the patient (or patient’s representative) and answered their questions. The patient or their representative has agreed to have anesthesia services.     _______________________________________________

## (undated) NOTE — LETTER
11/16/2017          Elier Willis  [address]    Dear Chago Guidry,     I reviewed the results from your recent gastroscopy while you were in the hospital. I tried to call you but was not successful.  The biopsies from the stomach

## (undated) NOTE — LETTER
DECLINATION FORM    [address] [facility] provides interpreters for patients who are deaf, hearing impaired, or have Limited [place] Proficiency in order to ensure thee patients an equal opportunity to benefit from services.  There Patient Name: Jone Cervantes     : 1950    Page 1 of 1     Printed: @DATE      Medical Record #: [id number]   Revised (03)

## (undated) NOTE — LETTER
Patient Name: Jimmy Navarrete  YOB: 1950          MRN number:  [id number]  Date:  6/15/2018  Referring Physician:  Geeta Mitchell          UPPER EXTREMITY EVALUATION:    Referring Physician: Dr. Coby Diana  Diagnosis: Biceps tendinitis, unspecified la • BPH (benign prostatic hypertrophy)    • Esophageal reflux    • Obesity    • Osteoarthritis    • STROKE     8 or 9 yrs ago per family/ pt denies this 12/13/17   • Stroke [facility])    • Type II or unspecified type diabetes mellitus without mention of complicat Flexion: R 5/5; L 5/5*  Abduction: R 5/5; L 4+/5*  ER: R 4+/5; L 4+/5*  IR: R 5/5; L 5/5*    *L shoulder pain      Special tests: Neers Impingement Sign: R positive, L positive  Biceps Load Test: R negative, L negative     Today’s Treatment and Response: P Electrical Stim; Ultrasound;  Pt education; Home exercise program instructions    Education or treatment limitation: Communication  Rehab Potential:good    FOTO: 36/100    Current G Code: Carrying, Moving and Handling Objects CL: 60-79% impaired, limited, o